# Patient Record
Sex: MALE | Race: WHITE
[De-identification: names, ages, dates, MRNs, and addresses within clinical notes are randomized per-mention and may not be internally consistent; named-entity substitution may affect disease eponyms.]

---

## 2016-10-03 VITALS
DIASTOLIC BLOOD PRESSURE: 62 MMHG | SYSTOLIC BLOOD PRESSURE: 137 MMHG | SYSTOLIC BLOOD PRESSURE: 137 MMHG | DIASTOLIC BLOOD PRESSURE: 62 MMHG | SYSTOLIC BLOOD PRESSURE: 137 MMHG | DIASTOLIC BLOOD PRESSURE: 62 MMHG

## 2017-05-04 ENCOUNTER — HOSPITAL ENCOUNTER (OUTPATIENT)
Dept: HOSPITAL 61 - ECHO | Age: 62
Discharge: HOME | End: 2017-05-04
Attending: INTERNAL MEDICINE
Payer: MEDICARE

## 2017-05-04 DIAGNOSIS — I08.0: Primary | ICD-10-CM

## 2017-05-04 PROCEDURE — C8929 TTE W OR WO FOL WCON,DOPPLER: HCPCS

## 2017-05-04 NOTE — CARD
--------------- APPROVED REPORT --------------





EXAM: Two-dimensional and M-mode echocardiogram with Doppler and color Doppler.



Other Information 

Quality : Technically LimitedHR: 61bpm

Technically limited study due to  body habitus and CABG.



INDICATION

Aortic Valve Disease



2D DIMENSIONS 

RVDd3.8 (2.9-3.5cm)Left Atrium(2D)4.7 (1.6-4.0cm)

IVSd1.4 (0.7-1.1cm)Aortic Root(2D)2.8 (2.0-3.7cm)

LVDd5.7 (3.9-5.9cm)LVOT Diameter2.1 (1.8-2.4cm)

PWd1.4 (0.7-1.1cm)LVDs4.0 (2.5-4.0cm)

FS (%) 30.4 %SV91.6 ml

LVEF(%)57.0 (>50%)



Aortic Valve

AoV Peak Dante.381.3cm/sAoV VTI92.8cm

AO Peak GR.58.2mmHgLVOT Peak Dante.120.3cm/s

LVOT  VTI 28.79cmAO Mean GR.40mmHg

ELIZABETH (VMAX)1.04pi6VWE   (VTI)1.11cm2

AI P 1/2 [id number]



Mitral Valve

MV E Yzoiyxso782.6cm/sMV DECEL [id number]

MV A Yrhkeqpx88.2cm/sMV [id number]

E/A  Ratio1.6MV A [id number]

MVA (PHT)3.90cm2



TDI

E/Lateral E'11.8E/Medial E'15.5



 LEFT VENTRICLE 

The left ventricle is normal size. There is mild concentric left ventricular hypertrophy. Left ventri
joann systolic function is normal. The Ejection Fraction is 60-65%. There is normal LV segmental wall m
otion. The left ventricular diastolic function and filling is normal for age. There is no ventricular
 septal defect visualized.



 RIGHT VENTRICLE 

The right ventricle is normal size. The right ventricular systolic function is normal.



 ATRIA 

The left atrium is mildly dilated. The right atrium size appears normal. The interatrial septum is in
tact with no evidence for an atrial septal defect or patent foramen ovale as noted on 2-D or Doppler 
imaging.



 AORTIC VALVE 

Aortic valve not well visualized. The aortic valve is severely calcified and displays decreased openi
ng. Doppler and Color Flow revealed mild aortic regurgitation. Maximum pressure gradient of 58 mmHg a
nd mean pressure gradient of 34 mmHg. Peak velocity of 3.68 m/s. Dimensionless index of 0.33. ELIZABETH 1.2
 cm2. ELIZABETH Index of 0.43. Overall, suggestive of severe aortic stenosis. 



 MITRAL VALVE 

Mitral annular calcification is mild. The mitral valve leaflets are calcified. There is no evidence o
f mitral valve prolapse. There is no mitral valve stenosis. Doppler and Color Flow revealed trace pipo
ral regurgitation.



 TRICUSPID VALVE 

The tricuspid valve is normal in structure and function. Doppler and Color Flow revealed no tricuspid
 valve regurgitation noted. There is no tricuspid valve stenosis.



 PULMONIC VALVE 

The pulmonary valve is normal in structure and function. There is no pulmonic valvular stenosis.



 GREAT VESSELS 

The aortic root is normal in size. The ascending aorta is normal in size. Due to poor image quality, 
the IVC could not be assessed.



 PERICARDIAL EFFUSION 

There is no pleural effusion. There is no evidence of significant pericardial effusion.



Critical Notification

Critical Value: No



<Conclusion>

Left ventricle systolic function is normal. The Ejection Fraction is 60-65%.

There is normal LV segmental wall motion.

Maximum pressure gradient of 58 mmHg and mean pressure gradient of 34 mmHg. Peak velocity of 3.68 m/s
. Dimensionless index of 0.33. ELIZABETH 1.2 cm2. ELIZABETH Index of 0.43. Overall, suggestive of severe aortic s
tenosis.

## 2017-07-14 ENCOUNTER — HOSPITAL ENCOUNTER (OUTPATIENT)
Dept: HOSPITAL 61 - KCIC MRI | Age: 62
Discharge: HOME | End: 2017-07-14
Attending: INTERNAL MEDICINE
Payer: MEDICARE

## 2017-07-14 DIAGNOSIS — M51.36: ICD-10-CM

## 2017-07-14 DIAGNOSIS — M48.06: Primary | ICD-10-CM

## 2017-07-14 DIAGNOSIS — M48.07: ICD-10-CM

## 2017-07-14 PROCEDURE — 72148 MRI LUMBAR SPINE W/O DYE: CPT

## 2017-07-14 NOTE — [facility]
MRI of the lumbar spine without contrast 7/14/2017

 

CLINICAL HISTORY: Low back pain which radiates down the right hip.

 

TECHNIQUE: Unenhanced T1-weighted, T2-weighted sagittal and axial and 

inversion recovery sagittal images of the lumbar spine were obtained.

 

FINDINGS: Comparison study is dated 6/22/2015

 

Minimal S-shaped curvature of the thoracolumbar spine is seen. 

Degenerative signal changes are seen involving the L3-4, L4-5 and L5-S1 

discs. Degenerative signal changes are seen within the marrow surrounding 

these discs. The conus medullaris is normal morphology, position, and 

signal characteristics.

 

The L1-2 disc space is within normal limits.

 

At the L2-3 disc space there is a mild generalized disc bulge. 

Degenerative changes are seen involving the facet joints bilaterally. 

There is mild ligamentum flavum hypertrophy bilaterally. These findings do

not result in significant central spinal canal or neural foraminal 

stenosis.

 

At the L3-4 disc space there is a mild generalized disc bulge. 

Degenerative changes are seen involving the facet joints bilaterally. 

There is mild to moderate ligamentum flavum hypertrophy. There is 

prominence of the posterior epidural fat. These findings when combined do 

not result in significant central spinal canal or neural foraminal 

stenosis.

 

At the L4-5 disc space there is a mild generalized disc bulge. 

Degenerative changes are seen involving the facet joints bilaterally. 

There is mild ligamentum flavum hypertrophy bilaterally. There is 

prominence of the posterior epidural fat. These findings when combined 

result in mild central spinal canal stenosis. No neural foraminal stenosis

is seen.

 

At the L5-S1 disc space there is a mild generalized disc bulge. 

Superimposed on the disc bulge is a right lateral focal disc protrusion. 

This measures 4 mm in AP diameter. Degenerative changes are seen involving

the facet joints bilaterally. There is mild ligamentum flavum hypertrophy 

bilaterally. These findings when combined do not result in significant 

central spinal canal stenosis. Mild right neural foraminal stenosis is 

seen. The left neural foramen is patent.

 

IMPRESSION: The changes of degenerative disc disease are seen involving 

the lumbar spine. These findings result in mild central spinal canal 

stenosis at L4-5. Mild right neural foraminal stenosis is seen at L5-S1.

 

Electronically signed by: Lm Jin MD (7/14/2017 2:43 PM) [facility]

## 2017-07-26 ENCOUNTER — HOSPITAL ENCOUNTER (OUTPATIENT)
Dept: HOSPITAL 61 - PNCL | Age: 62
Discharge: HOME | End: 2017-07-26
Attending: ANESTHESIOLOGY
Payer: MEDICARE

## 2017-07-26 DIAGNOSIS — M19.90: ICD-10-CM

## 2017-07-26 DIAGNOSIS — Z90.49: ICD-10-CM

## 2017-07-26 DIAGNOSIS — Z86.39: ICD-10-CM

## 2017-07-26 DIAGNOSIS — E78.00: ICD-10-CM

## 2017-07-26 DIAGNOSIS — Z88.6: ICD-10-CM

## 2017-07-26 DIAGNOSIS — Z87.39: ICD-10-CM

## 2017-07-26 DIAGNOSIS — Z87.891: ICD-10-CM

## 2017-07-26 DIAGNOSIS — E11.9: ICD-10-CM

## 2017-07-26 DIAGNOSIS — F17.200: ICD-10-CM

## 2017-07-26 DIAGNOSIS — F32.9: ICD-10-CM

## 2017-07-26 DIAGNOSIS — I10: ICD-10-CM

## 2017-07-26 DIAGNOSIS — Z72.0: ICD-10-CM

## 2017-07-26 DIAGNOSIS — E78.5: ICD-10-CM

## 2017-07-26 DIAGNOSIS — F41.9: ICD-10-CM

## 2017-07-26 DIAGNOSIS — H91.90: ICD-10-CM

## 2017-07-26 DIAGNOSIS — M51.16: Primary | ICD-10-CM

## 2017-07-26 DIAGNOSIS — Z86.69: ICD-10-CM

## 2017-07-26 PROCEDURE — 62323 NJX INTERLAMINAR LMBR/SAC: CPT

## 2017-07-27 NOTE — PAIN
DATE OF SERVICE:  07/26/2017



DIAGNOSES:  Lumbar radiculopathy with lumbar degenerative disk disease.



HISTORY OF PRESENT ILLNESS:  The patient is a 62-year-old male who returns for

followup, last seen in 04/2015.  The patient had a lumbar epidural steroid

injection and right sacroiliac joint injection.  He reports he did very well

after both of these to the right sacroiliac joint was helpful as was the lumbar

epidural steroid injection, but the patient reports that the pain has returned

over the past 2 years now in the low back, right lower extremity, much more

radiating at this time in to the posterior gluteus, posterior thigh, posterior

lateral thigh and into the lower leg, and sometimes into the ankle, but mostly

just into the calf on the right side.  The patient reports no new motor or

sensory deficits, no left-sided symptoms, reports his pain is anywhere from a 4

to 7 on a scale of 10, it is generally about 5.  The patient reports it as

aching and burning as well as a shooting pain that can be stabbing and cramping

in his low back and is becoming more constant, much more noticeable with

weightbearing, standing and walking much less comfortable and better with

sitting.  The patient reports it awakens him from sleep about once or twice a

night, but generally he sleeps fairly well.  It is better with lying down and

sitting is noted.  The patient reports no new motor or sensory deficits or other

complaints.  Did have a new MRI scan showing increased generalized disk bulge at

L5-S1 with a right lateral focal disk protrusion at about 4 mm in AP diameter,

also L4-L5.  The patient with some mild generalized disk bulging as well as at

L3-L4 and L2-L3.  The patient reports significant fatigability of the right

lower extremity and difficulty with walking and standing and reports he has

fallen several times over the past few months secondary to the legs feeling

unstable.



PAST MEDICAL HISTORY:  Significant for diabetes, hearing loss, shortness of

breath, sleep apnea, quit cigarette smoking, hyperlipidemia, ____, depression,

cardiac disease, arthritis, and headaches.



PREVIOUS SURGERY:  Include open heart surgery, appendectomy, left elbow surgery,

cervical fusion and tonsillectomy as well as right knee scope.



CURRENT MEDICATIONS:  Include metformin, trazodone, metoprolol, senna,

lisinopril, fluoxetine, omeprazole, NovoLog, bupropion, and trazodone.



FAMILY HISTORY:  Significant for no known medical conditions or illnesses.



SOCIAL HISTORY:  The patient has quit smoking.  Does not drink alcohol.  Lives

with his spouse and lives locally.



REVIEW OF SYSTEMS:  The patient's review of systems is positive for those items

mentioned in history of present illness, is completed, full, well documented on

the patient's chart.



PHYSICAL EXAMINATION:

VITAL SIGNS:  The patient's blood pressure is 131/62, pulse 78, respirations are

20, temperature is 98.3 degrees Fahrenheit, height is 6 feet 5 inches, weight is

334 pounds.

GENERAL:  The patient is awake, alert, oriented, appropriate, very pleasant

demeanor.

HEENT:  Head shows normocephalic, atraumatic.  Extraocular movements are intact

and symmetrical.  Oral cavity, mucous membranes are moist and pink.  Dentition

is intact.

NECK:  Shows anterior throat supple without palpable lymphadenopathy noted. 

Swallow reflex is symmetrical.  Neck shows full rotational motion with some mild

tenderness with extension, but not with forward flexion and good right and left

lateral rotation.

CHEST:  Shows normal on inspection.  Breath sounds are clear to auscultation

bilaterally.  No rales, rhonchi or wheezes were auscultated.

HEART:  Shows S1 and S2 clear.  No murmurs auscultated.

ABDOMEN:  Obese, soft, nontender, nondistended.  No palpable organomegaly is

noted.

BACK:  Shows spine grossly midline.  Normal appearing thoracic kyphosis and

lumbar lordotic curvatures.  Lumbar paraspinous muscle shows some moderate

tenderness diffusely in the lower lumbar distribution bilaterally, worse on the

right than the left, also some minor tenderness over the posterior superior

iliac spine on the right side, but not the left side, but only very minor with

palpation.  The patient shows good rotation and motion of the lumbar spine, both

laterally as well as extension and flexion without difficulty.

EXTREMITIES:  The patient's lower extremities showed deep tendon reflexes 1+ in

the patellar and tendo calcaneus tendons are equal.  Motor exam is intact with

dorsiflexion and extension rated at 5/5 at his quadriceps and hamstring flexion.

 Right side shows positive straight leg raise on the right at about 45 degrees,

but decreased with knee flexion, left side is negative.  Gaenslen's and

Isaac's maneuvers are negative bilaterally.  The patient is able to stand, but

difficulty standing and walking with significant favoring gait of his right

lower extremity.  He has a walker, but is not using it currently.



Options were discussed with the patient.  The patient's old chart was reviewed

as his current medication regimen updated.  Current review of systems updated

today as noted.  We will proceed with a lumbar epidural steroid injection.  He

has done well with these in the past by his report.  Risks were again discussed

including, but not limited to bleeding, infection, possibility of epidural

hematoma, subsequent neurologic compromise, dural puncture, headaches, spinal

cord and/or nerve damage, side effects of steroid medication and poor results

regarding pain control.  The patient understands and wishes to proceed.  The

patient will return to clinic in approximately 2 weeks for followup, was

counseled on return appointment, activity level and side effects to be aware of.



DIAGNOSIS:  Lumbar radiculopathy with lumbar degenerative disk disease.



PROCEDURES Lumbar epidural steroid injection in translaminar approach at the

L5-S1 level using C-arm fluoroscopic guidance under sterile prep and drape using

local anesthetic.  Medication injected is 120 mg Depo-Medrol plus 10 mL of

preservative-free normal saline and 2 mL of Isovue for contrast.



CONDITION AT DISCHARGE:  Stable.  The patient tolerated procedure well, had no

complications.

 



______________________________

JACKIE ALVAREZ MD



DR:  ROOSEVELT/miguelito  JOB#:  [id number] / [id number]

DD:  07/26/2017 13:38  DT:  07/27/2017 01:58

## 2017-12-31 VITALS — DIASTOLIC BLOOD PRESSURE: 57 MMHG | SYSTOLIC BLOOD PRESSURE: 128 MMHG

## 2018-03-23 ENCOUNTER — HOSPITAL ENCOUNTER (OUTPATIENT)
Dept: HOSPITAL 61 - KCIC | Age: 63
Discharge: HOME | End: 2018-03-23
Attending: INTERNAL MEDICINE
Payer: MEDICARE

## 2018-03-23 DIAGNOSIS — Z95.0: ICD-10-CM

## 2018-03-23 DIAGNOSIS — I35.0: Primary | ICD-10-CM

## 2018-03-23 DIAGNOSIS — R91.8: ICD-10-CM

## 2018-03-23 PROCEDURE — 71046 X-RAY EXAM CHEST 2 VIEWS: CPT

## 2018-08-14 ENCOUNTER — HOSPITAL ENCOUNTER (OUTPATIENT)
Dept: HOSPITAL 61 - ECHO | Age: 63
Discharge: HOME | End: 2018-08-14
Attending: INTERNAL MEDICINE
Payer: MEDICARE

## 2018-08-14 DIAGNOSIS — K21.9: ICD-10-CM

## 2018-08-14 DIAGNOSIS — E11.9: ICD-10-CM

## 2018-08-14 DIAGNOSIS — Z87.891: ICD-10-CM

## 2018-08-14 DIAGNOSIS — I11.0: ICD-10-CM

## 2018-08-14 DIAGNOSIS — I50.9: ICD-10-CM

## 2018-08-14 DIAGNOSIS — J44.9: ICD-10-CM

## 2018-08-14 DIAGNOSIS — I35.0: Primary | ICD-10-CM

## 2018-08-14 DIAGNOSIS — E78.00: ICD-10-CM

## 2018-08-14 DIAGNOSIS — I25.10: ICD-10-CM

## 2018-08-14 PROCEDURE — C8929 TTE W OR WO FOL WCON,DOPPLER: HCPCS

## 2018-08-14 NOTE — CARD
MR#: [id number]

Account#: [id number]

Accession#: [id number]

Date of Study: 08/14/2018

Ordering Physician: ISIS HARRELL, 

Referring Physician: ISIS HARRELL, 

Tech: KAREN Patino





--------------- APPROVED REPORT --------------





EXAM: Two-dimensional and M-mode echocardiogram with Doppler and color Doppler.



Other Information 

Quality : PoorHR: 91bpm

Technically limited study due to  body habitus and smoking.



INDICATION

Aortic Stenosis



Echo Enhancing Agent

Indication: Endocardial border delineation

Agent/Amount Used: Optison 3mL



Surgery/Intervention

Status/Post Aortic Valve Replacement: 

Pacemaker: 



RISK FACTORS

Hypertension 

Obesity   

Smoking 



2D DIMENSIONS 

RVDd5.2 (2.9-3.5cm)Left Atrium(2D)3.5 (1.6-4.0cm)

IVSd1.6 (0.7-1.1cm)Aortic Root(2D)3.0 (2.0-3.7cm)

LVDd3.9 (3.9-5.9cm)LVOT Diameter2.0 (1.8-2.4cm)

PWd1.6 (0.7-1.1cm)LVDs3.0 (2.5-4.0cm)

FS (%) 22.8 %SV30.0 ml

LVEF(%)46.4 (>50%)



Aortic Valve

AoV Peak Dante.111.2cm/sAoV VTI22.0cm

AO Peak GR.4.9mmHgLVOT Peak Dante.100.9cm/s

LVOT  VTI 20.82cmAO Mean GR.3mmHg

ELIZABETH (VMAX)1.74qh9UVD   (VTI)3.06cm2



Mitral Valve

MV E Apsdibcg93.2cm/sMV DECEL [id number]

MV A Gnncnizk223.4cm/sMV [id number]

E/A  Ratio0.7MVA (PHT)1.47cm2



TDI

E/Lateral E'9.5E/Medial E'8.9



Pulmonary Valve

RVOT VTI14.8cm



 LEFT VENTRICLE 

The left ventricle is normal size. There is moderate concentric left ventricular hypertrophy. The lef
t ventricular systolic function is normal and the ejection fraction is within normal range. ef 55% Th
ere is normal LV segmental wall motion. Transmitral Doppler flow pattern is Grade I-abnormal relaxati
on pattern.



 RIGHT VENTRICLE 

The right ventricle is mildly dilated. The right ventricular systolic function is normal.



 ATRIA 

The left atrium size is normal. The right atrium size is normal. The interatrial septum is intact wit
h no evidence for an atrial septal defect or patent foramen ovale as noted on 2-D or Doppler imaging.




 AORTIC VALVE 

Doppler and Color Flow revealed no significant aortic regurgitation. There is no significant aortic v
alvular stenosis. There is no aortic valvular vegetation. Transcatheter aortic valve not well visuali
zed. Grossly no significant stenosis or regurgitation. 



 MITRAL VALVE 

The mitral valve is not well visualized. There is no mitral valve stenosis. Doppler and Color Flow re
vealed no mitral valve regurgitation noted.



 TRICUSPID VALVE 

The tricuspid valve is not well visualized. Doppler and color-flow analysis was performed. There is n
o tricuspid valve stenosis.



 PULMONIC VALVE 

The pulmonic valve is not well visualized. Doppler and Color Flow revealed no pulmonic valvular regur
gitation. There is no pulmonic valvular stenosis.



 GREAT VESSELS 

The aortic root is not well visualized. The IVC was not visualized.



 PERICARDIAL EFFUSION 

There is no pleural effusion. There is no evidence of significant pericardial effusion.



Critical Notification

Critical Value: No



<Conclusion>

The left ventricular systolic function is normal and the ejection fraction is within normal range. ef
 55%

There is normal LV segmental wall motion.

The right ventricle is mildly  dilated.

Transcatheter aortic valve not well visualized. Grossly no significant stenosis or regurgitation.

Technically very difficult study despite contrast use due to body habitus.



Signed by : Isis Harrell, 

Electronically Approved : 08/14/2018 11:27:49

## 2018-10-06 ENCOUNTER — HOSPITAL ENCOUNTER (EMERGENCY)
Dept: HOSPITAL 61 - ER | Age: 63
LOS: 1 days | Discharge: HOME | End: 2018-10-07
Payer: MEDICARE

## 2018-10-06 VITALS — HEIGHT: 77 IN | WEIGHT: 315 LBS | BODY MASS INDEX: 37.19 KG/M2

## 2018-10-06 DIAGNOSIS — R55: ICD-10-CM

## 2018-10-06 DIAGNOSIS — M19.90: ICD-10-CM

## 2018-10-06 DIAGNOSIS — Z88.8: ICD-10-CM

## 2018-10-06 DIAGNOSIS — R07.89: ICD-10-CM

## 2018-10-06 DIAGNOSIS — R60.0: ICD-10-CM

## 2018-10-06 DIAGNOSIS — I10: ICD-10-CM

## 2018-10-06 DIAGNOSIS — Z95.0: ICD-10-CM

## 2018-10-06 DIAGNOSIS — E11.40: ICD-10-CM

## 2018-10-06 DIAGNOSIS — J45.909: ICD-10-CM

## 2018-10-06 DIAGNOSIS — I25.10: ICD-10-CM

## 2018-10-06 DIAGNOSIS — E78.00: ICD-10-CM

## 2018-10-06 DIAGNOSIS — Z95.5: ICD-10-CM

## 2018-10-06 DIAGNOSIS — K21.9: ICD-10-CM

## 2018-10-06 DIAGNOSIS — R25.2: Primary | ICD-10-CM

## 2018-10-06 DIAGNOSIS — Z98.1: ICD-10-CM

## 2018-10-06 PROCEDURE — 85025 COMPLETE CBC W/AUTO DIFF WBC: CPT

## 2018-10-06 PROCEDURE — 84484 ASSAY OF TROPONIN QUANT: CPT

## 2018-10-06 PROCEDURE — 93970 EXTREMITY STUDY: CPT

## 2018-10-06 PROCEDURE — 71045 X-RAY EXAM CHEST 1 VIEW: CPT

## 2018-10-06 PROCEDURE — 85379 FIBRIN DEGRADATION QUANT: CPT

## 2018-10-06 PROCEDURE — 36415 COLL VENOUS BLD VENIPUNCTURE: CPT

## 2018-10-06 PROCEDURE — 93005 ELECTROCARDIOGRAM TRACING: CPT

## 2018-10-06 PROCEDURE — 80053 COMPREHEN METABOLIC PANEL: CPT

## 2018-10-07 VITALS — DIASTOLIC BLOOD PRESSURE: 59 MMHG | SYSTOLIC BLOOD PRESSURE: 135 MMHG

## 2018-10-07 LAB
ALBUMIN SERPL-MCNC: 3.5 G/DL (ref 3.4–5)
ALBUMIN/GLOB SERPL: 0.9 {RATIO} (ref 1–1.7)
ALP SERPL-CCNC: 122 U/L (ref 46–116)
ALT SERPL-CCNC: 30 U/L (ref 16–63)
ANION GAP SERPL CALC-SCNC: 9 MMOL/L (ref 6–14)
AST SERPL-CCNC: 30 U/L (ref 15–37)
BASOPHILS # BLD AUTO: 0.1 X10^3/UL (ref 0–0.2)
BASOPHILS NFR BLD: 1 % (ref 0–3)
BILIRUB SERPL-MCNC: 0.3 MG/DL (ref 0.2–1)
BUN SERPL-MCNC: 20 MG/DL (ref 8–26)
BUN/CREAT SERPL: 14 (ref 6–20)
CALCIUM SERPL-MCNC: 9.6 MG/DL (ref 8.5–10.1)
CHLORIDE SERPL-SCNC: 100 MMOL/L (ref 98–107)
CO2 SERPL-SCNC: 33 MMOL/L (ref 21–32)
CREAT SERPL-MCNC: 1.4 MG/DL (ref 0.7–1.3)
EOSINOPHIL NFR BLD: 0.3 X10^3/UL (ref 0–0.7)
EOSINOPHIL NFR BLD: 3 % (ref 0–3)
ERYTHROCYTE [DISTWIDTH] IN BLOOD BY AUTOMATED COUNT: 17.5 % (ref 11.5–14.5)
GFR SERPLBLD BASED ON 1.73 SQ M-ARVRAT: 51 ML/MIN
GLOBULIN SER-MCNC: 3.8 G/DL (ref 2.2–3.8)
GLUCOSE SERPL-MCNC: 100 MG/DL (ref 70–99)
HCT VFR BLD CALC: 36.3 % (ref 39–53)
HGB BLD-MCNC: 12 G/DL (ref 13–17.5)
LYMPHOCYTES # BLD: 2.4 X10^3/UL (ref 1–4.8)
LYMPHOCYTES NFR BLD AUTO: 24 % (ref 24–48)
MCH RBC QN AUTO: 26 PG (ref 25–35)
MCHC RBC AUTO-ENTMCNC: 33 G/DL (ref 31–37)
MCV RBC AUTO: 79 FL (ref 79–100)
MONO #: 1.3 X10^3/UL (ref 0–1.1)
MONOCYTES NFR BLD: 13 % (ref 0–9)
NEUT #: 5.8 X10^3UL (ref 1.8–7.7)
NEUTROPHILS NFR BLD AUTO: 59 % (ref 31–73)
PLATELET # BLD AUTO: 256 X10^3/UL (ref 140–400)
POTASSIUM SERPL-SCNC: 4.2 MMOL/L (ref 3.5–5.1)
PROT SERPL-MCNC: 7.3 G/DL (ref 6.4–8.2)
RBC # BLD AUTO: 4.61 X10^6/UL (ref 4.3–5.7)
SODIUM SERPL-SCNC: 142 MMOL/L (ref 136–145)
WBC # BLD AUTO: 9.9 X10^3/UL (ref 4–11)

## 2018-10-07 NOTE — RAD
Chest AP portable at 1205:

 

Reason for examination: Syncope. Chest pain.

 

Comparison is made to previous study dated 3/23/2018.

 

Pacemaker remains present over the left hemithorax. There are postop 

changes in the sternum. The heart size appears to be enlarged. Lung fields

are clear. No acute bony abnormalities are seen. There are postop changes 

in the lower cervical spine.

 

IMPRESSION:

 

Cardiomegaly. No acute congestion or infiltrates.

 

Electronically signed by: Lorna Norris MD (10/7/2018 2:46 AM) [facility]

## 2018-10-07 NOTE — RAD
Bilateral lower extremity venous Doppler:

 

Reason for examination: Bilateral leg edema.

 

The right and left lower extremity venous systems were evaluated from the 

common femoral and greater saphenous veins distally to the calf veins with

grayscale imaging, color-flow imaging and spectral analysis.

 

There is no evidence of deep venous thrombosis seen. There appears to be 

normal venous blood flow. There is normal response of the venous systems 

to compression and augmentation.

 

IMPRESSION:

 

No deep venous thrombosis in the right or left lower extremity.

 

Electronically signed by: Lorna Norris MD (10/7/2018 1:31 AM) [facility]

## 2018-10-07 NOTE — EKG
[facility]

              [address] [place] [address]

Test Date:    2018-10-06               Test Time:    23:51:54

Pat Name:     ILIANA PINON          Department:   

Patient ID:   [id number]           Room:          

Gender:       M                        Technician:   

:          [id number]               Requested By: LUZMA MORGAN

Order Number: [id number]           Reading MD:     

                                 Measurements

Intervals                              Axis          

Rate:         92                       P:            31

NY:           178                      QRS:          69

QRSD:         146                      T:            23

QT:           408                                    

QTc:          510                                    

                           Interpretive Statements

SINUS RHYTHM

RIGHT BUNDLE BRANCH BLOCK

RVH WITH REPOLARIZATION ABNORMALITY

ABNORMAL ECG

RI6.01

No previous ECG available for comparison

## 2018-10-07 NOTE — PHYS DOC
Past Medical History


Past Medical History:  Arthritis, CAD, Depression, Diabetes-Type II, GERD, High 

Cholesterol, Hypertension, Other


Additional Past Medical Histor:  neuropathy


Past Surgical History:  Other


Additional Past Surgical Histo:  bilat great toes,bilat knee sx,R elbow,neck 

fusion,PACEMAKER,STENT,VALVE


Alcohol Use:  None


Drug Use:  None





Adult General


Chief Complaint


Chief Complaint:  WEAKNESS/GENERALIZED





HPI


HPI





Patient is a 63  year old male with history of CAD, osteoarthritis, trouble and 

diabetes, who presents with accidental fall from standing. Patient states he 

was getting out of bed to use bathroom please give out causing him to fall. 

Patient denies injury from fall but states he was unable to stand. Spouse 

reports the patient was week and difficulty responding, symptoms  corrected 

prior to EMS arrival. Patient denies headache, head injury, neck pain, 

palpitations, shortness of breath, focal extremity weakness or loss of 

sensation. Patient does acknowledge taking sleeping pill prior to bed per his 

normal routine. Other than leg cramps which are chronic, he denies any symptoms 

in the emergency department..[]





Review of Systems


Review of Systems


Review symptoms as per history of present illness. All other review symptoms 

are negative.[]





All other systems were reviewed and found to be within normal limits, except as 

documented in this note.





Allergies


Allergies





Allergies








Coded Allergies Type Severity Reaction Last Updated Verified


 


  cortisone Allergy Intermediate Pt had swelling at injection site 12/17/15 Yes











Physical Exam


Physical Exam





Constitutional: Well developed, well nourished, no acute distress, non-toxic 

appearance. []


HENT: Normocephalic, atraumatic, bilateral external ears normal, oropharynx 

moist, no oral exudates, nose normal. []


Eyes: PERRLA, EOMI, conjunctiva normal, no discharge. [] 


Neck: Normal range of motion, no tenderness, supple, no stridor. [] 


Cardiovascular:Heart rate regular rhythm, no murmur []


Lungs & Thorax:  Bilateral breath sounds clear to auscultation []


Abdomen: Bowel sounds normal, soft, no tenderness. [] 


Skin: Warm, dry, no erythema, no rash. [] 


Back: No tenderness, no CVA tenderness. [] 


Extremities: No tenderness, no edema. [] 


Neurologic: Alert and oriented X 3, normal motor function, normal sensory 

function, no focal deficits noted. []


Psychologic: Affect normal, judgement normal, mood normal. []





Current Patient Data


Vital Signs





 Vital Signs








  Date Time  Temp Pulse Resp B/P (MAP) Pulse Ox O2 Delivery O2 Flow Rate FiO2


 


10/7/18 02:45  90 20  92   


 


10/6/18 23:53 98.1   120/72 (88)  Room Air  





 98.1       








Lab Values





 Laboratory Tests








Test


 10/6/18


23:05


 


White Blood Count


 9.9 x10^3/uL


(4.0-11.0)


 


Red Blood Count


 4.61 x10^6/uL


(4.30-5.70)


 


Hemoglobin


 12.0 g/dL


(13.0-17.5)  L


 


Hematocrit


 36.3 %


(39.0-53.0)  L


 


Mean Corpuscular Volume


 79 fL ()





 


Mean Corpuscular Hemoglobin 26 pg (25-35)  


 


Mean Corpuscular Hemoglobin


Concent 33 g/dL


(31-37)


 


Red Cell Distribution Width


 17.5 %


(11.5-14.5)  H


 


Platelet Count


 256 x10^3/uL


(140-400)


 


Neutrophils (%) (Auto) 59 % (31-73)  


 


Lymphocytes (%) (Auto) 24 % (24-48)  


 


Monocytes (%) (Auto) 13 % (0-9)  H


 


Eosinophils (%) (Auto) 3 % (0-3)  


 


Basophils (%) (Auto) 1 % (0-3)  


 


Neutrophils # (Auto)


 5.8 x10^3uL


(1.8-7.7)


 


Lymphocytes # (Auto)


 2.4 x10^3/uL


(1.0-4.8)


 


Monocytes # (Auto)


 1.3 x10^3/uL


(0.0-1.1)  H


 


Eosinophils # (Auto)


 0.3 x10^3/uL


(0.0-0.7)


 


Basophils # (Auto)


 0.1 x10^3/uL


(0.0-0.2)


 


D-Dimer (Kalee)


 0.75 ug/mlFEU


(0.00-0.50)  H


 


Sodium Level


 142 mmol/L


(136-145)


 


Potassium Level


 4.2 mmol/L


(3.5-5.1)


 


Chloride Level


 100 mmol/L


()


 


Carbon Dioxide Level


 33 mmol/L


(21-32)  H


 


Anion Gap 9 (6-14)  


 


Blood Urea Nitrogen


 20 mg/dL


(8-26)


 


Creatinine


 1.4 mg/dL


(0.7-1.3)  H


 


Estimated GFR


(Cockcroft-Gault) 51.0  





 


BUN/Creatinine Ratio 14 (6-20)  


 


Glucose Level


 100 mg/dL


(70-99)  H


 


Calcium Level


 9.6 mg/dL


(8.5-10.1)


 


Total Bilirubin


 0.3 mg/dL


(0.2-1.0)


 


Aspartate Amino Transferase


(AST) 30 U/L (15-37)





 


Alanine Aminotransferase (ALT)


 30 U/L (16-63)





 


Alkaline Phosphatase


 122 U/L


()  H


 


Troponin I Quantitative


 < 0.017 ng/mL


(0.000-0.055)


 


Total Protein


 7.3 g/dL


(6.4-8.2)


 


Albumin


 3.5 g/dL


(3.4-5.0)


 


Albumin/Globulin Ratio


 0.9 (1.0-1.7)


L





 Laboratory Tests


10/6/18 23:05








 Laboratory Tests


10/6/18 23:05














EKG


EKG


[EKG: NAD]





Radiology/Procedures


Radiology/Procedures


CXR: NAD[]





Course & Med Decision Making


Course & Med Decision Making


Pertinent Labs and Imaging studies reviewed. (See chart for details)





[Labs, EKG imaging reviewed. Patient able to walk and wheelchair with steady 

gait.  feels improved and requests discharge home. Recommend follow-up 

with the for further evaluation. Return precautions reviewed. Patient 

verbalizes understanding agreement discharge instructions prior to departure.]





Dragon Disclaimer


Dragon Disclaimer


This electronic medical record was generated, in whole or in part, using a 

voice recognition dictation system.





Departure


Departure


Impression:  


 Primary Impression:  


 Bilateral leg cramps


Disposition:  01 HOME, SELF-CARE


Condition:  STABLE


Patient Instructions:  Leg Cramps





Additional Instructions:  


You were valuated emergency department for leg cramping brief change of 

consciousness. CT, lab and imaging studies were performed. The cause of your 

symptoms has not been determined. Please keep your walker next to the bed and 

use a bedside commode or urinal. Contact your PCP tomorrow and schedule follow-

up appointment early next week. Return to the ED if new or worsening symptoms..











LUZMA MORGAN DO Oct 7, 2018 05:21

## 2018-11-17 VITALS — DIASTOLIC BLOOD PRESSURE: 74 MMHG | SYSTOLIC BLOOD PRESSURE: 129 MMHG

## 2018-11-27 ENCOUNTER — HOSPITAL ENCOUNTER (OUTPATIENT)
Dept: HOSPITAL 61 - KCIC US | Age: 63
Discharge: HOME | End: 2018-11-27
Attending: INTERNAL MEDICINE
Payer: MEDICARE

## 2018-11-27 DIAGNOSIS — I65.23: Primary | ICD-10-CM

## 2018-11-27 PROCEDURE — 93880 EXTRACRANIAL BILAT STUDY: CPT

## 2018-11-27 NOTE — [facility]
EXAM: Carotid Doppler sonogram.

 

HISTORY: Carotid bruit.

 

TECHNIQUE: Gray scale and color Doppler sonographic evaluation of the neck

with spectral waveform analysis was performed and static images are 

submitted for review. 

 

FINDINGS: The exam is extremely limited due to patient body habitus and 

motion throughout the exam. There is moderate atherosclerotic plaque 

within the right greater left common carotid arteries, bilateral carotid 

bulbs and proximal internal and external carotid arteries.

 

The peak systolic velocity within the right common carotid artery is 140 

cm/sec. The peak systolic velocity within the right internal carotid 

artery is 172 cm/sec and the end diastolic velocity within the right 

internal carotid artery is 27 cm/sec. The right ICA/CCA ratio is 1.33.

 

The peak systolic velocity within the left common carotid artery is 1 

heart and 35 cm/sec. The peak systolic velocity within the left internal 

carotid artery is 98 cm/sec and the end diastolic velocity within the left

internal carotid artery is 28 cm/sec. The left ICA/CCA ratio is 0.72.

 

There is normal antegrade flow within the right vertebral artery. The left

vertebral artery is not well seen.

 

IMPRESSION:

1. Significantly limited exam due to body habitus and patient motion.

2. Moderate atherosclerotic plaque throughout the common, internal and 

external carotid arteries and carotid bulbs.

3. Elevated peak systolic velocity within the right ICA. Despite normal 

ICA to CCA ratio, this suggests 50-69 percent stenosis.

4. Suboptimal evaluation of the left vertebral artery. This may be due to 

low flow, hypoplasia or aforementioned study limitations.

 

 

 

 

PQRS Compliance Statement - Stenosis calculations for CT, MR and 

conventional angiography are based upon measurement of the distal ICA 

diameter in accordance with the NASCET methodology.  Stenosis calculations

for carotid ultrasound studies are derived from validated velocity 

criteria which are known to correlate with the NASCET methodology.

 

Electronically signed by: Kait Whittington MD (11/27/2018 3:37 PM) 

[facility]

## 2019-04-23 ENCOUNTER — HOSPITAL ENCOUNTER (EMERGENCY)
Dept: HOSPITAL 61 - ER | Age: 64
Discharge: HOME | End: 2019-04-23
Payer: MEDICARE

## 2019-04-23 VITALS
SYSTOLIC BLOOD PRESSURE: 169 MMHG | SYSTOLIC BLOOD PRESSURE: 169 MMHG | DIASTOLIC BLOOD PRESSURE: 70 MMHG | DIASTOLIC BLOOD PRESSURE: 70 MMHG | SYSTOLIC BLOOD PRESSURE: 169 MMHG | SYSTOLIC BLOOD PRESSURE: 169 MMHG | DIASTOLIC BLOOD PRESSURE: 70 MMHG | DIASTOLIC BLOOD PRESSURE: 70 MMHG

## 2019-04-23 VITALS — WEIGHT: 315 LBS | BODY MASS INDEX: 37.19 KG/M2 | HEIGHT: 77 IN

## 2019-04-23 DIAGNOSIS — E78.00: ICD-10-CM

## 2019-04-23 DIAGNOSIS — Z95.5: ICD-10-CM

## 2019-04-23 DIAGNOSIS — K21.9: ICD-10-CM

## 2019-04-23 DIAGNOSIS — Z88.8: ICD-10-CM

## 2019-04-23 DIAGNOSIS — I25.10: ICD-10-CM

## 2019-04-23 DIAGNOSIS — R06.02: ICD-10-CM

## 2019-04-23 DIAGNOSIS — E11.40: ICD-10-CM

## 2019-04-23 DIAGNOSIS — Z95.0: ICD-10-CM

## 2019-04-23 DIAGNOSIS — R07.89: Primary | ICD-10-CM

## 2019-04-23 DIAGNOSIS — I10: ICD-10-CM

## 2019-04-23 DIAGNOSIS — F32.9: ICD-10-CM

## 2019-04-23 LAB
ALBUMIN SERPL-MCNC: 3.1 G/DL (ref 3.4–5)
ALBUMIN/GLOB SERPL: 0.9 {RATIO} (ref 1–1.7)
ALP SERPL-CCNC: 113 U/L (ref 46–116)
ALT SERPL-CCNC: 23 U/L (ref 16–63)
ANION GAP SERPL CALC-SCNC: 8 MMOL/L (ref 6–14)
AST SERPL-CCNC: 25 U/L (ref 15–37)
BASOPHILS # BLD AUTO: 0 X10^3/UL (ref 0–0.2)
BASOPHILS NFR BLD: 1 % (ref 0–3)
BILIRUB SERPL-MCNC: 0.3 MG/DL (ref 0.2–1)
BUN SERPL-MCNC: 17 MG/DL (ref 8–26)
BUN/CREAT SERPL: 14 (ref 6–20)
CALCIUM SERPL-MCNC: 8.9 MG/DL (ref 8.5–10.1)
CHLORIDE SERPL-SCNC: 104 MMOL/L (ref 98–107)
CO2 SERPL-SCNC: 29 MMOL/L (ref 21–32)
CREAT SERPL-MCNC: 1.2 MG/DL (ref 0.7–1.3)
EOSINOPHIL NFR BLD: 0.1 X10^3/UL (ref 0–0.7)
EOSINOPHIL NFR BLD: 2 % (ref 0–3)
ERYTHROCYTE [DISTWIDTH] IN BLOOD BY AUTOMATED COUNT: 17.2 % (ref 11.5–14.5)
GFR SERPLBLD BASED ON 1.73 SQ M-ARVRAT: 61 ML/MIN
GLOBULIN SER-MCNC: 3.3 G/DL (ref 2.2–3.8)
GLUCOSE SERPL-MCNC: 150 MG/DL (ref 70–99)
HCT VFR BLD CALC: 33.7 % (ref 39–53)
HGB BLD-MCNC: 10.6 G/DL (ref 13–17.5)
LYMPHOCYTES # BLD: 1.3 X10^3/UL (ref 1–4.8)
LYMPHOCYTES NFR BLD AUTO: 18 % (ref 24–48)
MAGNESIUM SERPL-MCNC: 1.7 MG/DL (ref 1.8–2.4)
MCH RBC QN AUTO: 25 PG (ref 25–35)
MCHC RBC AUTO-ENTMCNC: 31 G/DL (ref 31–37)
MCV RBC AUTO: 78 FL (ref 79–100)
MONO #: 0.9 X10^3/UL (ref 0–1.1)
MONOCYTES NFR BLD: 12 % (ref 0–9)
NEUT #: 5 X10^3UL (ref 1.8–7.7)
NEUTROPHILS NFR BLD AUTO: 68 % (ref 31–73)
PLATELET # BLD AUTO: 239 X10^3/UL (ref 140–400)
POTASSIUM SERPL-SCNC: 4.4 MMOL/L (ref 3.5–5.1)
PROT SERPL-MCNC: 6.4 G/DL (ref 6.4–8.2)
PROTHROMBIN TIME: 13.5 SEC (ref 11.7–14)
RBC # BLD AUTO: 4.32 X10^6/UL (ref 4.3–5.7)
SODIUM SERPL-SCNC: 141 MMOL/L (ref 136–145)
WBC # BLD AUTO: 7.4 X10^3/UL (ref 4–11)

## 2019-04-23 PROCEDURE — 84484 ASSAY OF TROPONIN QUANT: CPT

## 2019-04-23 PROCEDURE — 85025 COMPLETE CBC W/AUTO DIFF WBC: CPT

## 2019-04-23 PROCEDURE — 71275 CT ANGIOGRAPHY CHEST: CPT

## 2019-04-23 PROCEDURE — 83735 ASSAY OF MAGNESIUM: CPT

## 2019-04-23 PROCEDURE — 93005 ELECTROCARDIOGRAM TRACING: CPT

## 2019-04-23 PROCEDURE — 83880 ASSAY OF NATRIURETIC PEPTIDE: CPT

## 2019-04-23 PROCEDURE — 99285 EMERGENCY DEPT VISIT HI MDM: CPT

## 2019-04-23 PROCEDURE — 36415 COLL VENOUS BLD VENIPUNCTURE: CPT

## 2019-04-23 PROCEDURE — 85610 PROTHROMBIN TIME: CPT

## 2019-04-23 PROCEDURE — 80053 COMPREHEN METABOLIC PANEL: CPT

## 2019-04-23 NOTE — PDOC2
CARDIAC CONSULT


DATE OF CONSULT


Date of Consult


DATE: 4/23/19 


TIME: 14:55





REASON FOR CONSULT


Reason for Consult:


Chest pain





REFERRING PHYSICIAN


Referring Physician:


Azar





SOURCE


Source:  Chart review, Patient





HISTORY OF PRESENT ILLNESS


HISTORY OF PRESENT ILLNESS


This is a pleasant 62 yo male admitted for complains of chest pain and SOA.  He 

is originally going to see his PCP today but she was on vacation and saw 

somebody else instead.  He complained of CP and SOA and some leg discomfort and 

was advised to go to ED for further eval.  I talked to him and his left chest 

pain is chronic and has not been worse and its dull.  His SOA is not 

significant.  His acitivity tolerance is very limited and blames his LUTHER due to 

his wt gain of 25 pounds in the last several months.  He does have DPN to his LE

but no open wounds.  He has known PAD but this is medically treated. No nausea, 

jaw tightness, palpitations.  His exertion does not make his CP worse or his LUTHER

is the same with exertion.  He has been complaint with his medications. No 

significant leg edema no different from his baseline and no PND or wheezing. No 

recent falls or injury.





PAST MEDICAL HISTORY


Past Medical History


Cardiovascular:  CAD, HTN, Syncope, Hyperlipidemia, Aortic stenosis, Other 

(orthostatic hypotension)


Pulmonary:  COPD, Other (SHADI)


CENTRAL NERVOUS SYSTEM:  Periperal neuropathy


GI:  GERD


Hepatobiliary:  Cholelithiasis


Psych:  Depression


Musculoskeletal:  Osteoarthritis


Rheumatologic:  Fibromyalgia


Endocrine:  Diabetes (2)








PAST SURGICAL HISTORY


Past Surgical History


Pacemaker, Appendectomy, Arthroscopy (right wrist left elbow), Cholecystectomy, 

CABG (x4 12/2015 LIMA graft to the LAD, a saphenous vein graft to the PDA and a 

second saphenous vein graft in skip fashion from a right ramus vessel and to an 

obtuse marginal vessel. ), Other (TAVR 11/2017; sinus surgery)





FAMILY HISTORY


Family History:  Heart Disease





SOCIAL HISTORY


Social History


Smoke:  Quit (76 pk yr)


ALCOHOL:  none


Drugs:  None


Lives:  alone





CURRENT MEDICATIONS


CURRENT MEDICATIONS





Current Medications








 Medications


  (Trade)  Dose


 Ordered  Sig/Mirian


 Route


 PRN Reason  Start Time


 Stop Time Status Last Admin


Dose Admin


 


 Iohexol


  (Omnipaque 350


 Mg/ml)  100 ml  1X  ONCE


 IV


   4/23/19 13:30


 4/23/19 13:31 DC 4/23/19 13:35














ALLERGIES


ALLERGIES:  


Coded Allergies:  


     cortisone (Verified  Allergy, Intermediate, Pt had swelling at injection 

site, 12/17/15)





ROS


Review of System


14 point ROS evaluated with pertinent positives noted per HPI





PHYSICAL EXAM


General:  Alert, Oriented X3, Cooperative, No acute distress


HEENT:  Atraumatic, Mucous membr. moist/pink


Lungs:  Clear to auscultation, Normal air movement


Heart:  Regular rate (SR)


Abdomen:  Soft, No tenderness, Other (obese)


Extremities:  No cyanosis, Other (1-2+ bilateral LE pitting edema)


Skin:  No breakdown, No significant lesion


Neuro:  Normal speech, Sensation intact


Psych/Mental Status:  Mental status NL, Mood NL


MUSCULOSKELETAL:  Osteoarthritic changes both hands





VITALS


VITALS





Vital Signs








  Date Time  Temp Pulse Resp B/P (MAP) Pulse Ox O2 Delivery O2 Flow Rate FiO2


 


4/23/19 12:46 97.9 93 20 145/65 (91) 94 Room Air  





 97.9       











LABS


Lab:





Laboratory Tests








Test


 4/23/19


12:50 4/23/19


12:55


 


White Blood Count


 7.4 x10^3/uL


(4.0-11.0) 





 


Red Blood Count


 4.32 x10^6/uL


(4.30-5.70) 





 


Hemoglobin


 10.6 g/dL


(13.0-17.5) 





 


Hematocrit


 33.7 %


(39.0-53.0) 





 


Mean Corpuscular Volume 78 fL ()  


 


Mean Corpuscular Hemoglobin 25 pg (25-35)  


 


Mean Corpuscular Hemoglobin


Concent 31 g/dL


(31-37) 





 


Red Cell Distribution Width


 17.2 %


(11.5-14.5) 





 


Platelet Count


 239 x10^3/uL


(140-400) 





 


Neutrophils (%) (Auto) 68 % (31-73)  


 


Lymphocytes (%) (Auto) 18 % (24-48)  


 


Monocytes (%) (Auto) 12 % (0-9)  


 


Eosinophils (%) (Auto) 2 % (0-3)  


 


Basophils (%) (Auto) 1 % (0-3)  


 


Neutrophils # (Auto)


 5.0 x10^3uL


(1.8-7.7) 





 


Lymphocytes # (Auto)


 1.3 x10^3/uL


(1.0-4.8) 





 


Monocytes # (Auto)


 0.9 x10^3/uL


(0.0-1.1) 





 


Eosinophils # (Auto)


 0.1 x10^3/uL


(0.0-0.7) 





 


Basophils # (Auto)


 0.0 x10^3/uL


(0.0-0.2) 





 


Prothrombin Time


 13.5 SEC


(11.7-14.0) 





 


Prothromb Time International


Ratio 1.1 (0.8-1.1) 


 





 


Sodium Level


 141 mmol/L


(136-145) 





 


Potassium Level


 4.4 mmol/L


(3.5-5.1) 





 


Chloride Level


 104 mmol/L


() 





 


Carbon Dioxide Level


 29 mmol/L


(21-32) 





 


Anion Gap 8 (6-14)  


 


Blood Urea Nitrogen


 17 mg/dL


(8-26) 





 


Creatinine


 1.2 mg/dL


(0.7-1.3) 





 


Estimated GFR


(Cockcroft-Gault) 61.0 


 





 


BUN/Creatinine Ratio 14 (6-20)  


 


Glucose Level


 150 mg/dL


(70-99) 





 


Calcium Level


 8.9 mg/dL


(8.5-10.1) 





 


Magnesium Level


 1.7 mg/dL


(1.8-2.4) 





 


Total Bilirubin


 0.3 mg/dL


(0.2-1.0) 





 


Aspartate Amino Transf


(AST/SGOT) 25 U/L (15-37) 


 





 


Alanine Aminotransferase


(ALT/SGPT) 23 U/L (16-63) 


 





 


Alkaline Phosphatase


 113 U/L


() 





 


NT-Pro-B-Type Natriuretic


Peptide 195 pg/mL


(0-124) 





 


Total Protein


 6.4 g/dL


(6.4-8.2) 





 


Albumin


 3.1 g/dL


(3.4-5.0) 





 


Albumin/Globulin Ratio 0.9 (1.0-1.7)  


 


Troponin I Quantitative


 


 0.041 ng/mL


(0.000-0.055)











ECHOCARDIOGRAM


ECHOCARDIOGRAM





<Conclusion>


The left ventricular systolic function is normal and the ejection fraction is 

within normal range. ef 55%


There is normal LV segmental wall motion.


The right ventricle is mildly  dilated.


Transcatheter aortic valve not well visualized. Grossly no significant stenosis 

or regurgitation.


Technically very difficult study despite contrast use due to body habitus.





DATE:     08/14/18 1127





ASSESSMENT/PLAN


ASSESSMENT/PLAN


1. Atypical CP; possibly MSK


2. Hx SH-OH with likely associated autonomic neuropathy


3. CAD: 12/2015 CABGx4. clinically stable


4. PPM in situ: dual chamber, [place]


5. Post TAVR: 11/2017


6. HTN: No antiHTN meds due to orthostasis. controlled


7. DM2/HLP


8. Morbid obesity/SHADI; sometimes not compliant with CPAP. 


9. COPD


10. Hx of multiple falls. 





Recommendations


1. discussed wt loss and compliance with diet. 


2. Continue with home regimen.


3. May DC if trop is normal. Will arrange for outpt stress test


4. Follow up in office as scheduled in 4 weeks.











TAISHA TATE          Apr 23, 2019 15:01

## 2019-04-23 NOTE — PHYS DOC
Past Medical History


Past Medical History:  Arthritis, CAD, Depression, Diabetes-Type II, GERD, High 

Cholesterol, Hypertension, Other


Additional Past Medical Histor:  neuropathy,SLEEP APNEA


Past Surgical History:  Angioplasty, Pacemaker, Other


Additional Past Surgical Histo:  bilat great toes/knee sx,L elbow,neck 

fusion,STENT,VALVE,ANAL FISSURE


Alcohol Use:  None


Drug Use:  None





Adult General


Chief Complaint


Chief Complaint:  SHORTNESS OF BREATH





HPI


HPI





Patient is a 63 year old M who presents with SOB and CP. He has a history of a 

quadruple bypass. He says he is sob and has cp with exertion. He denies 

dizziness, syncope, vomiting, fever, cough. He denies orthopnea. He says he 

[place] and became very sob. 





Cardiologist: Dr Faria





Review of Systems


Review of Systems





Constitutional: Denies fever or chills 


Eyes: Denies change in visual acuity, redness, or eye pain 


HENT: Denies nasal congestion or sore throat 


Respiratory: Denies cough; endorses shortness of breath 


Cardiovascular: endorses cp


GI: Denies abdominal pain, nausea, vomiting, bloody stools or diarrhea 


: Denies dysuria or hematuria 


Musculoskeletal: Denies back pain or joint pain 


Integument: Denies rash or skin lesions 


Neurologic: Denies headache, focal weakness or sensory changes 


Endocrine: Denies polyuria or polydipsia 





All other systems were reviewed and found to be within normal limits, except as 

documented in this note.





Current Medications


Current Medications





Current Medications








 Medications


  (Trade)  Dose


 Ordered  Sig/Mirian  Start Time


 Stop Time Status Last Admin


Dose Admin


 


 Info


  (CONTRAST GIVEN


 -- Rx MONITORING)  1 each  PRN DAILY  PRN  4/23/19 13:30


 4/25/19 13:29   





 


 Iohexol


  (Omnipaque 350


 Mg/ml)  100 ml  1X  ONCE  4/23/19 13:30


 4/23/19 13:31 DC 4/23/19 13:35


100 ML











Allergies


Allergies





Allergies








Coded Allergies Type Severity Reaction Last Updated Verified


 


  cortisone Allergy Intermediate Pt had swelling at injection site 12/17/15 Yes











Physical Exam


Physical Exam





Constitutional: Well developed, well nourished, no acute distress, non-toxic 

appearance. 


HENT: Normocephalic, atraumatic, bilateral external ears normal, oropharynx 

moist, no oral exudates, nose normal. 


Eyes: PERRLA, EOMI, conjunctiva normal, no discharge.  


Neck: Normal range of motion, no tenderness, supple, no stridor.  


Cardiovascular:Heart rate regular rhythm, no murmur 


Lungs & Thorax:  Bilateral breath sounds clear to auscultation 


Abdomen: Bowel sounds normal, soft, no tenderness, no masses, no pulsatile 

masses.  


Skin: Warm, dry, no erythema, no rash.  


Back: No tenderness, no CVA tenderness.  


Extremities: No tenderness, no cyanosis, no clubbing, ROM intact, no edema.  


Neurologic: Alert and oriented X 3, normal motor function, normal sensory 

function, no focal deficits noted. 


Psychologic: Affect normal, judgement normal, mood normal.





Current Patient Data


Vital Signs





                                   Vital Signs








  Date Time  Temp Pulse Resp B/P (MAP) Pulse Ox O2 Delivery O2 Flow Rate FiO2


 


4/23/19 12:46 97.9 93 20 145/65 (91) 94 Room Air  





 97.9       








Lab Values





                                Laboratory Tests








Test


 4/23/19


12:50 4/23/19


12:55 4/23/19


15:15


 


White Blood Count


 7.4 x10^3/uL


(4.0-11.0) 


 





 


Red Blood Count


 4.32 x10^6/uL


(4.30-5.70) 


 





 


Hemoglobin


 10.6 g/dL


(13.0-17.5)  L 


 





 


Hematocrit


 33.7 %


(39.0-53.0)  L 


 





 


Mean Corpuscular Volume


 78 fL ()


L 


 





 


Mean Corpuscular Hemoglobin 25 pg (25-35)    


 


Mean Corpuscular Hemoglobin


Concent 31 g/dL


(31-37) 


 





 


Red Cell Distribution Width


 17.2 %


(11.5-14.5)  H 


 





 


Platelet Count


 239 x10^3/uL


(140-400) 


 





 


Neutrophils (%) (Auto) 68 % (31-73)    


 


Lymphocytes (%) (Auto) 18 % (24-48)  L  


 


Monocytes (%) (Auto) 12 % (0-9)  H  


 


Eosinophils (%) (Auto) 2 % (0-3)    


 


Basophils (%) (Auto) 1 % (0-3)    


 


Neutrophils # (Auto)


 5.0 x10^3uL


(1.8-7.7) 


 





 


Lymphocytes # (Auto)


 1.3 x10^3/uL


(1.0-4.8) 


 





 


Monocytes # (Auto)


 0.9 x10^3/uL


(0.0-1.1) 


 





 


Eosinophils # (Auto)


 0.1 x10^3/uL


(0.0-0.7) 


 





 


Basophils # (Auto)


 0.0 x10^3/uL


(0.0-0.2) 


 





 


Prothrombin Time


 13.5 SEC


(11.7-14.0) 


 





 


Prothrombin Time INR 1.1 (0.8-1.1)    


 


Sodium Level


 141 mmol/L


(136-145) 


 





 


Potassium Level


 4.4 mmol/L


(3.5-5.1) 


 





 


Chloride Level


 104 mmol/L


() 


 





 


Carbon Dioxide Level


 29 mmol/L


(21-32) 


 





 


Anion Gap 8 (6-14)    


 


Blood Urea Nitrogen


 17 mg/dL


(8-26) 


 





 


Creatinine


 1.2 mg/dL


(0.7-1.3) 


 





 


Estimated GFR


(Cockcroft-Gault) 61.0  


 


 





 


BUN/Creatinine Ratio 14 (6-20)    


 


Glucose Level


 150 mg/dL


(70-99)  H 


 





 


Calcium Level


 8.9 mg/dL


(8.5-10.1) 


 





 


Magnesium Level


 1.7 mg/dL


(1.8-2.4)  L 


 





 


Total Bilirubin


 0.3 mg/dL


(0.2-1.0) 


 





 


Aspartate Amino Transferase


(AST) 25 U/L (15-37)


 


 





 


Alanine Aminotransferase (ALT)


 23 U/L (16-63)


 


 





 


Alkaline Phosphatase


 113 U/L


() 


 





 


NT-Pro-B-Type Natriuretic


Peptide 195 pg/mL


(0-124)  H 


 





 


Total Protein


 6.4 g/dL


(6.4-8.2) 


 





 


Albumin


 3.1 g/dL


(3.4-5.0)  L 


 





 


Albumin/Globulin Ratio


 0.9 (1.0-1.7)


L 


 





 


Troponin I Quantitative


 


 0.041 ng/mL


(0.000-0.055) 0.033 ng/mL


(0.000-0.055)





                                Laboratory Tests


4/23/19 12:50








                                Laboratory Tests


4/23/19 12:50











EKG


EKG


Sinus rhythm 92 bpm, no ST elev or depr, nonischemic, QTc 503





Radiology/Procedures


Radiology/Procedures


[]





Course & Med Decision Making


Course & Med Decision Making


Pertinent Labs and Imaging studies reviewed. (See chart for details)





64 y/o M with h/o 4xCABG presents for cp and sob with exertion.


Labs show neg trop.


EKG no acute ischemia.


CT chest neg for PE.


I recommended obs admission for ACS rule out - pt requests 2nd opinion. 

Consulted Dr. Bob Medeiros. He recs dc home if delta trop neg.


Repeat trop neg. Dc home. Discussed return precautions.





Dragon Disclaimer


Dragon Disclaimer


This electronic medical record was generated, in whole or in part, using a voice

 recognition dictation system.





Departure


Departure


Impression:  


   Primary Impression:  


   Chest pain


Disposition:  01 HOME, SELF-CARE


Condition:  IMPROVED


Referrals:  


IRNO LAY MD (PCP)


Patient Instructions:  Chest Pain (Nonspecific)











LINDY BAH MD           Apr 23, 2019 12:52

## 2019-04-23 NOTE — RAD
CT ANGIOGRAPHY CHEST

 

Indication: CHEST PAIN, SOB, SENT TO THE ED FROM HIS DOCTOR<BR>IV OMNI 350

100 MLS<BR>PREVIOUS .

 

Technique: After intravenous contrast administration, CT imaging was 

performed of the chest. MIP reconstructions were obtained.

 

Exposure: One or more of the following individualized dose reduction 

techniques were utilized for this examination:  1. Automated exposure 

control  2. Adjustment of the mA and/or kV according to patient size  3. 

Use of iterative reconstruction technique.

 

No prior study available for comparison

 

Pulsatility artifact at the main pulmonary artery. No evidence of 

pulmonary embolism. The thoracic aorta is calcified without evidence of an

aneurysm. No evidence of dissection in the thoracic aorta. There is a 

stent or graft at the aortic valve. Coronary artery calcifications.

 

No significant lymph node enlargement. No pericardial effusion. Partially 

seen thyroid is symmetric. No significant pleural effusion.

 

There is no evidence of consolidating infiltrate or dominant mass in 

either lung. There are mild markings in both lungs particularly 

inferiorly, likely atelectasis. Bullae identified in the right lower lung.

Trachea and mainstem bronchi are patent.

 

Degenerative spondylosis of the spine. Vertebral body height is 

maintained.

 

Limited slices through the upper abdomen. These demonstrate no acute 

findings.

 

IMPRESSION:

1. No evidence of pulmonary embolism.

2. Atherosclerotic disease

3. No consolidating infiltrate in either lung.

 

Electronically signed by: Cruz Ayala MD (4/23/2019 1:53 PM) [facility]

## 2019-04-23 NOTE — EKG
[facility]

              [address] [place] [address]

Test Date:    2019               Test Time:    12:37:47

Pat Name:     ILIANA PINON          Department:   

Patient ID:   [id number]           Room:          

Gender:       M                        Technician:   

:          [id number]               Requested By: LINDY BAH

Order Number: [id number]           Reading MD:   Cem Parnell

                                 Measurements

Intervals                              Axis          

Rate:         92                       P:            31

SD:           188                      QRS:          58

QRSD:         116                      T:            24

QT:           402                                    

QTc:          503                                    

                           Interpretive Statements

SINUS RHYTHM

QRS(T) CONTOUR ABNORMALITY

CONSIDER ANTEROLATERAL MYOCARDIAL DAMAGE

PROLONGED QT

POSSIBLY ABNORMAL ECG

RI6.01          Unconfirmed report

Compared to ECG 2018 09:45:43

Prolonged QT interval now present

Right bundle-branch block no longer present



Electronically Signed On 2019 11:32:53 CDT by Cem Parnell

## 2019-07-26 ENCOUNTER — HOSPITAL ENCOUNTER (OUTPATIENT)
Dept: HOSPITAL 61 - ECHO | Age: 64
Discharge: HOME | End: 2019-07-26
Attending: INTERNAL MEDICINE
Payer: MEDICARE

## 2019-07-26 DIAGNOSIS — E11.9: ICD-10-CM

## 2019-07-26 DIAGNOSIS — Z87.891: ICD-10-CM

## 2019-07-26 DIAGNOSIS — Z95.1: ICD-10-CM

## 2019-07-26 DIAGNOSIS — I11.9: ICD-10-CM

## 2019-07-26 DIAGNOSIS — I65.23: Primary | ICD-10-CM

## 2019-07-26 DIAGNOSIS — I73.9: ICD-10-CM

## 2019-07-26 DIAGNOSIS — Z95.2: ICD-10-CM

## 2019-07-26 DIAGNOSIS — I25.10: ICD-10-CM

## 2019-07-26 PROCEDURE — C8929 TTE W OR WO FOL WCON,DOPPLER: HCPCS

## 2019-07-26 PROCEDURE — 93925 LOWER EXTREMITY STUDY: CPT

## 2019-07-26 PROCEDURE — 93880 EXTRACRANIAL BILAT STUDY: CPT

## 2019-07-26 NOTE — RAD
MR#: [id number]

Account#: [id number]

Accession#: [id number]

Date of Study: 07/26/2019

Ordering Physician: ISIS HARRELL, 

Referring Physician: ISIS HARRELL, 

Tech: Maribell Mcmahan, DANGELO, RVT, RTR





--------------- APPROVED REPORT --------------





Patient Location: OUT-PATIENT



Indications

PAD

CAD



Risk Factors

Hypertension

Diabetes                        



VELOCITY AND DOPPLER WAVEFORM ANALYSIS

RIGHT cm/secWaveformSeverity LEFT cm/secWaveform Severity 

pCFA 271.0pCFA 192.8

Prof Fem Art. 300.8Prof Fem Art. 98.1

Fem Art Prox. 206.3Fem Art Prox. 175.2

Fem Art Mid. 185.1Fem Art Mid. 258.1

Fem Art Dist. 89.2Fem Art Dist. 213.6

Pop Art(AK) 80.4Pop Art(AK) 95.9

PTA Prox. 49.7PTA Prox. 34.3

PTA Dist. 99.7PTA Dist. 79.9

Per Art Prox. 60.0Per Art Prox. 73.8

TAMI Prox. 45.1ATA Prox. 21.7

DPA 27DPA 15



Findings

Grayscale images demonstrate diffuse plaque.



Based on velocities and spectral imaging there is likely at least moderate stenosis involving the rig
ht CFA and left CFA.



Probable greater than 50% stenosis involving the left SFA.



There is three-vessel runoff below the knee with adequate velocities in the right posterior tibial, p
eroneal and anterior tibial arteries would likely diffuse mild-to-moderate disease.



On the left there is likely a greater than 50% stenosis involving the posterior tibial artery and ant
erior tibial artery with robust 1 vessel runoff in the form of the peroneal vessel.



Critical Notification

Critical Value: No



<Conclusion>

1. Moderate to severe disease involving the bilateral CFA, and left anterior and posterior tibial ves
sels.



Signed by : Isis Harrell, 

Electronically Approved : 07/26/2019 16:53:26

## 2019-07-26 NOTE — CARD
MR#: [id number]

Account#: [id number]

Accession#: [id number]

Date of Study: 07/26/2019

Ordering Physician: ISIS HARRELL, 

Referring Physician: ISIS HARRELL, 

Tech: Celine Valdes RDCS





--------------- APPROVED REPORT --------------





EXAM: Two-dimensional and M-mode echocardiogram with Doppler, color Doppler with contrast.



Other Information 

Quality : Technically LimitedHR: 85bpm

Rhythm : NSRTechnically limited study due to  body habitus, smoking, and CABG.



INDICATION

AVR



Echo Enhancing Agent

Indication: Endocardial border delineation

Agent/Amount Used: Optison 1mL



Surgery/Intervention

Status/Post Aortic Valve Replacement: 



2D DIMENSIONS 

RVDd3.0 (2.9-3.5cm)Left Atrium(2D)4.2 (1.6-4.0cm)

IVSd1.7 (0.7-1.1cm)Aortic Root(2D)2.8 (2.0-3.7cm)

LVDd4.6 (3.9-5.9cm)LVOT Diameter2.1 (1.8-2.4cm)

PWd1.7 (0.7-1.1cm)LVDs3.3 (2.5-4.0cm)

FS (%) 26.8 %SV50.1 ml

LVEF(%)53.0 (>50%)



M-Mode DIMENSIONS 

Left Atrium(MM)4.08 (2.5-4.0cm)Aortic Root3.09 (2.2-3.7cm)



Aortic Valve

AoV Peak Dante.215.9cm/sAoV VTI42.3cm

AO Peak GR.18.7mmHgLVOT Peak Dante.108.6cm/s

AO Mean GR.11mmHgAVA (VMAX)1.76cm2

ELIZABETH   (VTI)1.80cm2



Mitral Valve

MV E Qazlpbeb596.3cm/sMV E Peak Gr.9mmHg

MV DECEL [id number] A Uloppbja029.5cm/s

MV E Mean Gr.4mmHgE/A  Ratio1.5



Pulmonary Valve

PV Peak Wmqzvctp351.4cm/s



Tricuspid Valve

TR P. Kdivzmqn300pg/sRAP OXYUKGPS9xbTs

TR Peak Gr.56iuIfITFH35geTf



 LEFT VENTRICLE 

The left ventricle is normal size. There is moderate concentric left ventricular hypertrophy. The lef
t ventricular systolic function is normal and the ejection fraction is within normal range. The Eject
ion Fraction is 55-60%. There is grossly normal LV segmental wall motion. Difficult to visualize wall
 motion despite contrast use. Transmitral Doppler flow pattern is abnormal.



 RIGHT VENTRICLE 

The right ventricle is normal size. There is normal right ventricular wall thickness. The right ventr
icular systolic function is normal.



 ATRIA 

The left atrium is mildly dilated. The right atrium size is normal. The interatrial septum is intact 
with no evidence for an atrial septal defect or patent foramen ovale as noted on 2-D or Doppler imagi
ng.



 AORTIC VALVE 

There is a prosthetic aortic valve prosthesis. Doppler and Color Flow revealed no significant aortic 
regurgitation. Maximum pressure gradient of 19 mmHg and mean pressure gradient of 11 mmHg. There is a
 prosthetic aortic valve prosthesis. The prosthetic aortic valve is not well visualized.



 MITRAL VALVE 

The mitral valve is thickened but opens well. There is no evidence of mitral valve prolapse. There is
 no mitral valve stenosis. Doppler and Color-flow revealed trace mitral regurgitation.



 TRICUSPID VALVE 

The tricuspid valve is normal in structure and function. Doppler and Color Flow revealed trace tricus
pid regurgitation. The PA pressure was estimated at 21 mmHg. There is no tricuspid valve prolapse or 
vegetation. There is no tricuspid valve stenosis.



 PULMONIC VALVE 

The pulmonic valve is not well visualized.



 GREAT VESSELS 

The aortic root is normal in size. The ascending aorta is normal in size. The IVC was not visualized.




 PERICARDIAL EFFUSION 

There is no evidence of significant pericardial effusion.



Critical Notification

Critical Value: No



<Conclusion>

The left ventricular systolic function is normal and the ejection fraction is within normal range. Th
e Ejection Fraction is 55-60%.

There is grossly normal LV segmental wall motion.  Difficult to visualize wall motion despite contras
t use.

There is a prosthetic aortic valve prosthesis. The prosthetic aortic valve is not well visualized.

Maximum pressure gradient of 19 mmHg and mean pressure gradient of 11 mmHg.



Signed by : Isis Harrell, 

Electronically Approved : 07/26/2019 15:12:33

## 2019-07-26 NOTE — RAD
MR#: [id number]

Account#: [id number]

Accession#: [id number]

Date of Study: 07/26/2019

Ordering Physician: ISIS HARRELL,

Referring Physician: ISIS HARRELL,

Tech: Maribell Mcmahan, DANGELO, RVT, RTR





--------------- APPROVED REPORT --------------





Patient Location: OUT-PATIENT

Laterality:Bilateral



Indications

Carotid Stenosis in last scan



Risk Factors

Hypertension: 

Diabetes

PAD



Doppler Spectral Velocity Analysis

Right   Left    

pCCA     122/12 cm/spCCA     153/26 cm/s

mCCA     136/17 cm/smCCA     138/23 cm/s

dCCA     112/19 cm/sdCCA     114/17 cm/s

Bulb     107/15 cm/sBulb     116/22 cm/s

ECA      146/19 cm/sECA      222/25 cm/s

pICA     173/37 cm/spICA     136/21 cm/s

Daniel     127/29 cm/smICA     119/30 cm/s

dICA     132/30 cm/sdICA     65/17 cm/s

Vert.    112/225 cm/sVert.    24/6 cm/s

ICA/CCA  1.27ICA/CCA  0.89



Findings

Grayscale images of the bilateral carotid vasculature reveals diffuse moderate plaque.



The right internal carotid artery likely has a moderate stenosis approximately 50-69% by velocity cri
teria. Normal ICA to CCA ratios.



The left internal carotid artery also has likely a moderate stenosis in the 50-69% range. [place] grea
ter than 70% stenosis involving the left external carotid artery. Normal ICA to CCA ratio.



Patent vertebral arteries bilaterally.



Critical Notification

Critical Value: No



<Conclusion>

1. Moderate bilateral internal carotid artery disease.



Signed by : Isis Harrell, 

Electronically Approved : 07/26/2019 16:45:23

## 2019-08-12 ENCOUNTER — HOSPITAL ENCOUNTER (OUTPATIENT)
Dept: HOSPITAL 61 - KCIC US | Age: 64
Discharge: HOME | End: 2019-08-12
Attending: INTERNAL MEDICINE
Payer: MEDICARE

## 2019-08-12 DIAGNOSIS — M79.604: ICD-10-CM

## 2019-08-12 DIAGNOSIS — M79.605: ICD-10-CM

## 2019-08-12 DIAGNOSIS — M79.89: Primary | ICD-10-CM

## 2019-08-12 PROCEDURE — 93970 EXTREMITY STUDY: CPT

## 2019-08-12 NOTE — [facility]
Examination:  Lower Extremity Venous Doppler Ultrasound

 

History: Bilateral leg pain, swelling

 

Comparison: None

 

Procedure: Gray scale, color flow 2D and spectal waveform analysis images 

are obtained with and without compression in the area of the common 

femoral vein, superficial femoral vein - femoral vein junction, main 

femoral vein (superficial femoral vein) and popliteal vein. Veins of the 

proximal calf are also imaged.

 

Findings:

 

There is normal duplex flow, color flow and compressibility of all 

visualized vein segments. No evidence of deep venous thrombus is present.

 

Impression: 

 

No evidence of DVT in the bilateral lower extremity venous system.

 

Electronically signed by: Anderson Denis MD (8/12/2019 4:27 PM) [facility]

## 2019-09-25 ENCOUNTER — HOSPITAL ENCOUNTER (OUTPATIENT)
Dept: HOSPITAL 61 - MRI | Age: 64
Discharge: HOME | End: 2019-09-25
Attending: PODIATRIST
Payer: MEDICARE

## 2019-09-25 DIAGNOSIS — S93.412A: Primary | ICD-10-CM

## 2019-09-25 DIAGNOSIS — S93.492A: ICD-10-CM
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
## 2019-09-25 DIAGNOSIS — S86.012A: ICD-10-CM
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
## 2019-09-25 PROCEDURE — 73721 MRI JNT OF LWR EXTRE W/O DYE: CPT

## 2019-09-25 NOTE — RAD
MR of the left ankle

 

HISTORY: Achilles tendon pain.

 

TECHNIQUE: Routine multiplanar sequences are obtained.

 

FINDINGS:

 

1. Complete rupture of the Achilles tendon from the insertion, involving 

greater than 90 percent of the fibers. There is a very thin irregular 

remnant of the tendon maintaining continuity. The torn tendon is 

proximally retracted 3.5 cm. Heterogeneous fluid or hemorrhage within the 

retrocalcaneal bursa. Moderate size enthesophyte at the calcaneal 

insertion, with some tendinous calcaneal marrow edema.

 

Peroneal tendons are intact. Mild thickening and signal within the 

anterior talofibular ligament and calcaneofibular ligament compatible with

sprain. Posterior talofibular ligament is intact. The tibiofibular 

syndesmotic ligaments are intact.

 

Posterior tibial and flexor tendons are intact. No acute medial 

ligamentous tear.

 

Anterior tibial and extensor tendons are intact. No acute plantar 

fasciitis.

 

Subtalar joints are mildly degenerative but patent. Tarsal sinus is 

intact. Talar dome is intact.

 

Diffuse subcutaneous edema around the ankle.

 

IMPRESSION:

1. Near complete rupture of the Achilles tendon from the calcaneal 

insertion, with 3.5 cm proximal retraction.

2. Anterior talofibular and calcaneofibular ligament sprains.

 

Electronically signed by: Cruz Ayala MD (9/25/2019 4:39 PM) [facility]

## 2019-10-02 ENCOUNTER — HOSPITAL ENCOUNTER (INPATIENT)
Dept: HOSPITAL 61 - ER | Age: 64
LOS: 5 days | Discharge: NURSING, REHAB OR LONG-TERM CARE | DRG: 562 | End: 2019-10-07
Attending: INTERNAL MEDICINE | Admitting: INTERNAL MEDICINE
Payer: MEDICARE

## 2019-10-02 VITALS — BODY MASS INDEX: 37.19 KG/M2 | WEIGHT: 315 LBS | HEIGHT: 77 IN

## 2019-10-02 VITALS — DIASTOLIC BLOOD PRESSURE: 43 MMHG | SYSTOLIC BLOOD PRESSURE: 118 MMHG

## 2019-10-02 VITALS — DIASTOLIC BLOOD PRESSURE: 40 MMHG | SYSTOLIC BLOOD PRESSURE: 112 MMHG

## 2019-10-02 DIAGNOSIS — I10: ICD-10-CM

## 2019-10-02 DIAGNOSIS — E66.01: ICD-10-CM

## 2019-10-02 DIAGNOSIS — I25.10: ICD-10-CM

## 2019-10-02 DIAGNOSIS — Z95.1: ICD-10-CM

## 2019-10-02 DIAGNOSIS — E11.649: ICD-10-CM

## 2019-10-02 DIAGNOSIS — E78.00: ICD-10-CM

## 2019-10-02 DIAGNOSIS — Z83.3: ICD-10-CM

## 2019-10-02 DIAGNOSIS — F32.9: ICD-10-CM

## 2019-10-02 DIAGNOSIS — Z98.1: ICD-10-CM

## 2019-10-02 DIAGNOSIS — Z71.3: ICD-10-CM

## 2019-10-02 DIAGNOSIS — Z95.2: ICD-10-CM

## 2019-10-02 DIAGNOSIS — M79.7: ICD-10-CM
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
## 2019-10-02 DIAGNOSIS — G47.30: ICD-10-CM

## 2019-10-02 DIAGNOSIS — F41.9: ICD-10-CM

## 2019-10-02 DIAGNOSIS — E11.51: ICD-10-CM

## 2019-10-02 DIAGNOSIS — Z59.0: ICD-10-CM

## 2019-10-02 DIAGNOSIS — Z88.8: ICD-10-CM

## 2019-10-02 DIAGNOSIS — K21.9: ICD-10-CM
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
## 2019-10-02 DIAGNOSIS — I48.91: ICD-10-CM

## 2019-10-02 DIAGNOSIS — S86.011A: ICD-10-CM

## 2019-10-02 DIAGNOSIS — N17.0: ICD-10-CM

## 2019-10-02 DIAGNOSIS — M19.90: ICD-10-CM
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
## 2019-10-02 DIAGNOSIS — J44.9: ICD-10-CM

## 2019-10-02 DIAGNOSIS — E86.0: ICD-10-CM

## 2019-10-02 DIAGNOSIS — E78.5: ICD-10-CM

## 2019-10-02 DIAGNOSIS — S86.012A: Primary | ICD-10-CM

## 2019-10-02 DIAGNOSIS — Z95.0: ICD-10-CM

## 2019-10-02 LAB
ALBUMIN SERPL-MCNC: 3.5 G/DL (ref 3.4–5)
ALBUMIN/GLOB SERPL: 0.8 {RATIO} (ref 1–1.7)
ALP SERPL-CCNC: 122 U/L (ref 46–116)
ALT SERPL-CCNC: 23 U/L (ref 16–63)
ANION GAP SERPL CALC-SCNC: 6 MMOL/L (ref 6–14)
APTT PPP: YELLOW S
AST SERPL-CCNC: 28 U/L (ref 15–37)
BACTERIA #/AREA URNS HPF: (no result) /HPF
BASOPHILS # BLD AUTO: 0.1 X10^3/UL (ref 0–0.2)
BASOPHILS NFR BLD: 1 % (ref 0–3)
BILIRUB SERPL-MCNC: 0.4 MG/DL (ref 0.2–1)
BILIRUB UR QL STRIP: NEGATIVE
BUN SERPL-MCNC: 30 MG/DL (ref 8–26)
BUN/CREAT SERPL: 18 (ref 6–20)
CALCIUM SERPL-MCNC: 9.6 MG/DL (ref 8.5–10.1)
CHLORIDE SERPL-SCNC: 99 MMOL/L (ref 98–107)
CO2 SERPL-SCNC: 33 MMOL/L (ref 21–32)
CREAT SERPL-MCNC: 1.7 MG/DL (ref 0.7–1.3)
CRP SERPL-MCNC: 20.2 MG/L (ref 0–3.3)
EOSINOPHIL NFR BLD: 0.1 X10^3/UL (ref 0–0.7)
EOSINOPHIL NFR BLD: 1 % (ref 0–3)
ERYTHROCYTE [DISTWIDTH] IN BLOOD BY AUTOMATED COUNT: 19 % (ref 11.5–14.5)
FIBRINOGEN PPP-MCNC: CLEAR MG/DL
GFR SERPLBLD BASED ON 1.73 SQ M-ARVRAT: 40.7 ML/MIN
GLOBULIN SER-MCNC: 4.2 G/DL (ref 2.2–3.8)
GLUCOSE SERPL-MCNC: 85 MG/DL (ref 70–99)
HCT VFR BLD CALC: 37.7 % (ref 39–53)
HGB BLD-MCNC: 12.1 G/DL (ref 13–17.5)
HYALINE CASTS #/AREA URNS LPF: (no result) /HPF
LYMPHOCYTES # BLD: 1.4 X10^3/UL (ref 1–4.8)
LYMPHOCYTES NFR BLD AUTO: 12 % (ref 24–48)
MAGNESIUM SERPL-MCNC: 1.8 MG/DL (ref 1.8–2.4)
MCH RBC QN AUTO: 25 PG (ref 25–35)
MCHC RBC AUTO-ENTMCNC: 32 G/DL (ref 31–37)
MCV RBC AUTO: 79 FL (ref 79–100)
MONO #: 1.1 X10^3/UL (ref 0–1.1)
MONOCYTES NFR BLD: 10 % (ref 0–9)
NEUT #: 8.7 X10^3/UL (ref 1.8–7.7)
NEUTROPHILS NFR BLD AUTO: 76 % (ref 31–73)
NITRITE UR QL STRIP: NEGATIVE
PH UR STRIP: 5 [PH]
PLATELET # BLD AUTO: 272 X10^3/UL (ref 140–400)
POTASSIUM SERPL-SCNC: 4.4 MMOL/L (ref 3.5–5.1)
PROT SERPL-MCNC: 7.7 G/DL (ref 6.4–8.2)
PROT UR STRIP-MCNC: NEGATIVE MG/DL
RBC # BLD AUTO: 4.76 X10^6/UL (ref 4.3–5.7)
RBC #/AREA URNS HPF: (no result) /HPF (ref 0–2)
SODIUM SERPL-SCNC: 138 MMOL/L (ref 136–145)
SQUAMOUS #/AREA URNS LPF: (no result) /LPF
UROBILINOGEN UR-MCNC: 0.2 MG/DL
WBC # BLD AUTO: 11.3 X10^3/UL (ref 4–11)

## 2019-10-02 PROCEDURE — 80069 RENAL FUNCTION PANEL: CPT

## 2019-10-02 PROCEDURE — 82962 GLUCOSE BLOOD TEST: CPT

## 2019-10-02 PROCEDURE — 94660 CPAP INITIATION&MGMT: CPT

## 2019-10-02 PROCEDURE — 86140 C-REACTIVE PROTEIN: CPT

## 2019-10-02 PROCEDURE — 87086 URINE CULTURE/COLONY COUNT: CPT

## 2019-10-02 PROCEDURE — G0379 DIRECT REFER HOSPITAL OBSERV: HCPCS

## 2019-10-02 PROCEDURE — 80048 BASIC METABOLIC PNL TOTAL CA: CPT

## 2019-10-02 PROCEDURE — 5A09357 ASSISTANCE WITH RESPIRATORY VENTILATION, LESS THAN 24 CONSECUTIVE HOURS, CONTINUOUS POSITIVE AIRWAY PRESSURE: ICD-10-PCS | Performed by: INTERNAL MEDICINE

## 2019-10-02 PROCEDURE — G0378 HOSPITAL OBSERVATION PER HR: HCPCS

## 2019-10-02 PROCEDURE — 80053 COMPREHEN METABOLIC PANEL: CPT

## 2019-10-02 PROCEDURE — 81001 URINALYSIS AUTO W/SCOPE: CPT

## 2019-10-02 PROCEDURE — 83735 ASSAY OF MAGNESIUM: CPT

## 2019-10-02 PROCEDURE — 36415 COLL VENOUS BLD VENIPUNCTURE: CPT

## 2019-10-02 PROCEDURE — 85025 COMPLETE CBC W/AUTO DIFF WBC: CPT

## 2019-10-02 RX ADMIN — OXYCODONE HYDROCHLORIDE AND ACETAMINOPHEN PRN TAB: 10; 325 TABLET ORAL at 23:03

## 2019-10-02 RX ADMIN — TRAZODONE HYDROCHLORIDE PRN MG: 50 TABLET ORAL at 23:02

## 2019-10-02 RX ADMIN — ATORVASTATIN CALCIUM SCH MG: 40 TABLET, FILM COATED ORAL at 23:02

## 2019-10-02 SDOH — ECONOMIC STABILITY - HOUSING INSECURITY: HOMELESSNESS: Z59.0

## 2019-10-02 NOTE — PHYS DOC
Past Medical History


Past Medical History:  Arthritis, CAD, Depression, Diabetes-Type II, GERD, High 

Cholesterol, Hypertension, Other


Additional Past Medical Histor:  neuropathy,SLEEP APNEA, obesity


 (BREANA CULVER)


Past Surgical History:  Angioplasty, Coronary Bypass Surgery, Pacemaker, Other


Additional Past Surgical Histo:  bilat great toes/knee sx,L elbow,neck 

fusion,STENT,VALVE,ANAL FISSURE


 (BREANA CULVER)


Alcohol Use:  None


Drug Use:  None


 (BREANA CULVER)


Attending Signature


I have participated in the care of this patient and I have reviewed and agree 

with all pertinent clinical information above including history, exam, and 

recommendations.





 (LUPE HUNTER MD)





Adult General


Chief Complaint


Chief Complaint:  HYPOGLYCEMIA





[facility]


HPI





Patient is a 64  year old male, brought to the emergency department by EMS with 

complaint of hypoglycemia. Patient called EMS because he has bilateral Achilles 

tendon ruptures and is unable to walk. He requests to be placed in a rehab fa

cility as he is non-ambulatory. [facility] He was casted on his left lower extremity for the left ruptured 

Achilles tendon yesterday. PT states last night when he was going up the steps 

he felt his right Achilles tendon rupture. Patient states he has not had an MRI 

to evaluate the rupture yet. He currently rates his pain as 7 out of 10 on pain 

scale and both of his lower extremities. On EMS arrival patient's blood sugar 

was 45, he ate a candy bar and his blood sugar eliel to 74. Currently, on arrival

to the emergency room his blood sugars 76. Patient eyes any other complaints, 

reports a history of diabetes, high blood pressure, high cholesterol, coronary 

artery disease, arthritis, depression, and anxiety.


 (BREANA CULVER)





Review of Systems


Review of Systems





Constitutional: Denies fever or chills []


Eyes: Denies change in visual acuity, redness, or eye pain []


HENT: Denies nasal congestion or sore throat []


Respiratory: Denies cough or shortness of breath []


Cardiovascular: No additional information not addressed in HPI []


GI: Denies abdominal pain, nausea, vomiting, or diarrhea []


: Denies dysuria or hematuria []


Musculoskeletal: Denies back pain; see HPI []


Integument: Denies rash or skin lesions []


Neurologic: Denies headache


Endocrine: Denies polyuria or polydipsia; see HPI]





Complete systems were reviewed and found to be within normal limits, except as 

documented in this note.


 (BREANA CULVER)





Allergies


Allergies





Allergies








Coded Allergies Type Severity Reaction Last Updated Verified


 


  cortisone Allergy Intermediate Pt had swelling at injection site 12/17/15 Yes





 (LUPE HUNTER MD)





Physical Exam


Physical Exam





Constitutional: Well developed, well nourished, no acute distress, non-toxic 

appearance, obese. []


HENT: Normocephalic, atraumatic, bilateral external ears normal, oropharynx 

moist, no oral exudates, nose normal. []


Eyes: PERRLA, EOMI, conjunctiva normal, no discharge. [] 


Neck: Normal range of motion, no stridor. [] 


Cardiovascular:Heart rate regular rhythm


Lungs & Thorax:  Bilateral breath sounds clear to auscultation []


Abdomen: Bowel sounds normal, soft, no tenderness, no masses, no pulsatile 

masses. [] 


Skin: Warm, dry, no erythema, no rash. [] 


Back: No tenderness, no CVA tenderness. [] 


Extremities: No cyanosis, no edema; LLE in cast, RLE in ortho boot


Neurologic: Alert and oriented X 3, no focal deficits noted. []


Psychologic: Affect normal, judgement normal, mood normal. []


 (BREANA CULVER)





Current Patient Data


Vital Signs





                                   Vital Signs








  Date Time  Temp Pulse Resp B/P (MAP) Pulse Ox O2 Delivery O2 Flow Rate FiO2


 


10/2/19 19:05  94 16 138/61 (86) 97   


 


10/2/19 18:21      Room Air  


 


10/2/19 17:30 98.3       





 98.3       





 (LUPE HUNTER MD)


Lab Values





                                Laboratory Tests








Test


 10/2/19


17:34 10/2/19


18:00 10/2/19


18:42 10/2/19


19:35


 


Glucose (Fingerstick)


 76 mg/dL


(70-99) 


 96 mg/dL


(70-99) 





 


White Blood Count


 


 11.3 x10^3/uL


(4.0-11.0)  H 


 





 


Red Blood Count


 


 4.76 x10^6/uL


(4.30-5.70) 


 





 


Hemoglobin


 


 12.1 g/dL


(13.0-17.5)  L 


 





 


Hematocrit


 


 37.7 %


(39.0-53.0)  L 


 





 


Mean Corpuscular Volume


 


 79 fL ()


 


 





 


Mean Corpuscular Hemoglobin  25 pg (25-35)    


 


Mean Corpuscular Hemoglobin


Concent 


 32 g/dL


(31-37) 


 





 


Red Cell Distribution Width


 


 19.0 %


(11.5-14.5)  H 


 





 


Platelet Count


 


 272 x10^3/uL


(140-400) 


 





 


Neutrophils (%) (Auto)  76 % (31-73)  H  


 


Lymphocytes (%) (Auto)  12 % (24-48)  L  


 


Monocytes (%) (Auto)  10 % (0-9)  H  


 


Eosinophils (%) (Auto)  1 % (0-3)    


 


Basophils (%) (Auto)  1 % (0-3)    


 


Neutrophils # (Auto)


 


 8.7 x10^3/uL


(1.8-7.7)  H 


 





 


Lymphocytes # (Auto)


 


 1.4 x10^3/uL


(1.0-4.8) 


 





 


Monocytes # (Auto)


 


 1.1 x10^3/uL


(0.0-1.1) 


 





 


Eosinophils # (Auto)


 


 0.1 x10^3/uL


(0.0-0.7) 


 





 


Basophils # (Auto)


 


 0.1 x10^3/uL


(0.0-0.2) 


 





 


Sodium Level


 


 138 mmol/L


(136-145) 


 





 


Potassium Level


 


 4.4 mmol/L


(3.5-5.1) 


 





 


Chloride Level


 


 99 mmol/L


() 


 





 


Carbon Dioxide Level


 


 33 mmol/L


(21-32)  H 


 





 


Anion Gap  6 (6-14)    


 


Blood Urea Nitrogen


 


 30 mg/dL


(8-26)  H 


 





 


Creatinine


 


 1.7 mg/dL


(0.7-1.3)  H 


 





 


Estimated GFR


(Cockcroft-Gault) 


 40.7  


 


 





 


BUN/Creatinine Ratio  18 (6-20)    


 


Glucose Level


 


 85 mg/dL


(70-99) 


 





 


Calcium Level


 


 9.6 mg/dL


(8.5-10.1) 


 





 


Magnesium Level


 


 1.8 mg/dL


(1.8-2.4) 


 





 


Total Bilirubin


 


 0.4 mg/dL


(0.2-1.0) 


 





 


Aspartate Amino Transferase


(AST) 


 28 U/L (15-37)


 


 





 


Alanine Aminotransferase (ALT)


 


 23 U/L (16-63)


 


 





 


Alkaline Phosphatase


 


 122 U/L


()  H 


 





 


C-Reactive Protein,


Quantitative 


 20.2 mg/L


(0-3.3)  H 


 





 


Total Protein


 


 7.7 g/dL


(6.4-8.2) 


 





 


Albumin


 


 3.5 g/dL


(3.4-5.0) 


 





 


Albumin/Globulin Ratio


 


 0.8 (1.0-1.7)


L 


 





 


Urine Collection Type    Unknown  


 


Urine Color    Yellow  


 


Urine Clarity    Clear  


 


Urine pH    5.0  


 


Urine Specific Gravity    1.020  


 


Urine Protein


 


 


 


 Negative mg/dL


(NEG-TRACE)


 


Urine Glucose (UA)


 


 


 


 Negative mg/dL


(NEG)


 


Urine Ketones (Stick)


 


 


 


 Negative mg/dL


(NEG)


 


Urine Blood


 


 


 


 Negative (NEG)





 


Urine Nitrite


 


 


 


 Negative (NEG)





 


Urine Bilirubin


 


 


 


 Negative (NEG)





 


Urine Urobilinogen Dipstick


 


 


 


 0.2 mg/dL (0.2


mg/dL)


 


Urine Leukocyte Esterase


 


 


 


 Negative (NEG)





 


Urine RBC


 


 


 


 6-10 /HPF


(0-2)


 


Urine WBC


 


 


 


 11-20 /HPF


(0-4)


 


Urine Squamous Epithelial


Cells 


 


 


 Mod /LPF  





 


Urine Bacteria


 


 


 


 Moderate /HPF


(0-FEW)


 


Urine Hyaline Casts    Many /HPF  


 


Urine Mucus    Marked /LPF  





                                Laboratory Tests


10/2/19 18:00








                                Laboratory Tests


10/2/19 18:00








 (LUPE HUNTER MD)





EKG


EKG


[]


 (BREANA CULVER)





Radiology/Procedures


Radiology/Procedures


[]


 (BREANA CULVER)





Course & Med Decision Making


Course & Med Decision Making


Pertinent Labs and Imaging studies reviewed. (See chart for details)


dx: hypoglycemia, inability to ambulate


1911- Spoke with Dr. Pena who is the admitting physician, and care was assumed

 following discussion of patient. Pt admitted as observation patient for 

hypoglycemia and inability to ambulate. 


Patient's vital signs stable, patient remains afebrile, appears nontoxic, 

respirations even and unlabored. Patient will be admitted to the med/surg floor.

 Patient's case and plan of care also discussed with Dr. Hunter


[]


 (BREANA CULVER)





Dragon Disclaimer


Dragon Disclaimer


This electronic medical record was generated, in whole or in part, using a voice

 recognition dictation system.


 (BREANA CULVER)





Departure


Departure


Impression:  


   Primary Impression:  


   Hypoglycemia


   Additional Impression:  


   Unable to walk


Disposition:  09 ADMITTED AS INPATIENT


Admitting Physician:  HIMS ([place])


 (BREANA CULVER)


Condition:  STABLE


Referrals:  


IRON LAY MD (PCP)





Problem Qualifiers











BREANA CULVER        Oct 2, 2019 19:17


LUPE HUNTER MD               Oct 3, 2019 03:00

## 2019-10-02 NOTE — NUR
The patient, ILIANA PINON, 63 y/o, M admitted by JEFF SILVA III, DO, was given 
written information regarding hospital policies, unit procedures and contact persons.  



Valuables were checked and Assessment was complete, will continue to monitor

## 2019-10-03 VITALS — SYSTOLIC BLOOD PRESSURE: 103 MMHG | DIASTOLIC BLOOD PRESSURE: 66 MMHG

## 2019-10-03 VITALS — SYSTOLIC BLOOD PRESSURE: 137 MMHG | DIASTOLIC BLOOD PRESSURE: 44 MMHG

## 2019-10-03 VITALS — DIASTOLIC BLOOD PRESSURE: 57 MMHG | SYSTOLIC BLOOD PRESSURE: 114 MMHG

## 2019-10-03 VITALS — SYSTOLIC BLOOD PRESSURE: 98 MMHG | DIASTOLIC BLOOD PRESSURE: 41 MMHG

## 2019-10-03 VITALS — SYSTOLIC BLOOD PRESSURE: 117 MMHG | DIASTOLIC BLOOD PRESSURE: 43 MMHG

## 2019-10-03 VITALS — DIASTOLIC BLOOD PRESSURE: 52 MMHG | SYSTOLIC BLOOD PRESSURE: 136 MMHG

## 2019-10-03 PROCEDURE — 5A09357 ASSISTANCE WITH RESPIRATORY VENTILATION, LESS THAN 24 CONSECUTIVE HOURS, CONTINUOUS POSITIVE AIRWAY PRESSURE: ICD-10-PCS | Performed by: INTERNAL MEDICINE

## 2019-10-03 RX ADMIN — OXYCODONE HYDROCHLORIDE AND ACETAMINOPHEN PRN TAB: 10; 325 TABLET ORAL at 23:31

## 2019-10-03 RX ADMIN — TRAZODONE HYDROCHLORIDE PRN MG: 50 TABLET ORAL at 23:30

## 2019-10-03 RX ADMIN — INSULIN LISPRO SCH UNITS: 100 INJECTION, SOLUTION INTRAVENOUS; SUBCUTANEOUS at 16:30

## 2019-10-03 RX ADMIN — INSULIN GLARGINE SCH UNIT: 100 INJECTION, SOLUTION SUBCUTANEOUS at 20:45

## 2019-10-03 RX ADMIN — VITAMIN D, TAB 1000IU (100/BT) SCH UNIT: 25 TAB at 10:07

## 2019-10-03 RX ADMIN — ACETAMINOPHEN SCH MG: 325 TABLET, FILM COATED ORAL at 20:36

## 2019-10-03 RX ADMIN — DOCUSATE SODIUM SCH MG: 100 CAPSULE, LIQUID FILLED ORAL at 10:06

## 2019-10-03 RX ADMIN — OXYCODONE HYDROCHLORIDE AND ACETAMINOPHEN PRN TAB: 10; 325 TABLET ORAL at 09:03

## 2019-10-03 RX ADMIN — INSULIN LISPRO SCH UNITS: 100 INJECTION, SOLUTION INTRAVENOUS; SUBCUTANEOUS at 10:13

## 2019-10-03 RX ADMIN — DOCUSATE SODIUM SCH MG: 100 CAPSULE, LIQUID FILLED ORAL at 20:37

## 2019-10-03 RX ADMIN — INSULIN LISPRO SCH UNITS: 100 INJECTION, SOLUTION INTRAVENOUS; SUBCUTANEOUS at 12:39

## 2019-10-03 RX ADMIN — BUPROPION HYDROCHLORIDE SCH MG: 150 TABLET, FILM COATED, EXTENDED RELEASE ORAL at 10:06

## 2019-10-03 RX ADMIN — CITALOPRAM HYDROBROMIDE SCH MG: 20 TABLET ORAL at 10:07

## 2019-10-03 RX ADMIN — ATORVASTATIN CALCIUM SCH MG: 40 TABLET, FILM COATED ORAL at 20:37

## 2019-10-03 RX ADMIN — LISINOPRIL SCH MG: 5 TABLET ORAL at 10:07

## 2019-10-03 RX ADMIN — BUPROPION HYDROCHLORIDE SCH MG: 150 TABLET, FILM COATED, EXTENDED RELEASE ORAL at 20:37

## 2019-10-03 RX ADMIN — FUROSEMIDE SCH MG: 80 TABLET ORAL at 10:06

## 2019-10-03 NOTE — NUR
Patient complaints of feeling "dizzy" VS taken BP 91/35 HR 83 O2 97 BS 98. MD Feng 
notified new orders received. Will continue to monitor.

## 2019-10-03 NOTE — HP
ADMIT DATE:  10/02/2019



CHIEF COMPLAINT:  Ankle pain, leg pain, and fall.



HISTORY OF PRESENT ILLNESS:  The patient is a pleasant 64-year-old male who

ruptured his left Achilles tendon in the recent past.  Since then, he has had a

cast on that leg.  He has got some wounds now.  Today, he got up and fell and he

ruptured his right Achilles tendon.  I discussed the case with ER physician.  We

are going to admit the patient and consult Orthopedics.



PAST MEDICAL HISTORY:  Hypertension, hyperlipidemia, obesity, previous Achilles

tendon rupture on the left, coronary bypass surgery, peripheral vascular

disease, diabetes.



ALLERGIES:  None.



FAMILY HISTORY:  Diabetes.



SOCIAL HISTORY:  He is .  He does not drink, smoke or take drugs.  He is

retired.



MEDICATIONS:  Reviewed, please refer to the MRAD.



REVIEW OF SYSTEMS:  

GENERAL:  No history of weight change, weakness or fevers.

SKIN:  No bruising, hair changes or rashes.

EYES:  No blurred, double or loss of vision.

NOSE AND THROAT:  No history of nosebleeds, hoarseness or sore throat.

HEART:  No history of palpitations, chest pain or shortness of breath on

exertion.

LUNGS:  Denies cough, hemoptysis, wheezing or shortness of breath.

GASTROINTESTINAL:  Denies changes in appetite, nausea, vomiting, diarrhea or

constipation.

GENITOURINARY:  No history of frequency, urgency, hesitancy or nocturia.

NEUROLOGIC:  Denies history of numbness, tingling, tremor or weakness.

PSYCHIATRIC:  No history of panic, anxiety or depression.

ENDOCRINE:  No history of heat or cold intolerance, polyuria or polydipsia.

EXTREMITIES:  He complains of leg pain bilaterally.



PHYSICAL EXAMINATION:

VITALS:  Within normal limits and are stable.

GENERAL:  No apparent distress.  Alert and oriented.

HEENT:  Head is normocephalic, atraumatic, pupils were equally round and

reactive to light and accommodation.

NECK:  Supple, no JVD, no thyromegaly was noted.

LUNGS:  Clear to auscultation in all lung fields without rhonchi or wheezing.

HEART:  RRR, S1, S2 present.  Peripheral pulses intact, no obvious murmurs were

noted.

ABDOMEN:  Soft, nontender.  Positive bowel sounds no organomegaly, normal bowel

sounds.

EXTREMITIES:  He has cast on the left leg and the right foot and ankle are

tender to touch.

NEUROLOGIC:  Normal speech, normal tone.  A and O x3, moves all extremities, no

obvious focal deficits.

PSYCHIATRIC:  Normal affect, normal mood.  Stable.

SKIN:  No ulcerations or rashes, good skin turgor, no jaundice.

VASCULAR:  Good capillary refill, neurovascular bundle appears to be intact.



ASSESSMENT AND PLAN:  Bilateral Achilles tendon ruptures.  The patient has been

admitted.  We will consult Orthopedics.  PT, OT, home meds, and DVT prophylaxis.

 

Full code.  Skilled nursing unit evaluation.

 



______________________________

JEFF SILVA DO



DR:  EILEEN/miguelito  JOB#:  [id number] / [id number]

DD:  10/02/2019 23:47  DT:  10/03/2019 00:09

## 2019-10-03 NOTE — PDOC
PROGRESS NOTES


Chief Complaint


Chief Complaint


A/P:


Achilles tendon injury - he felt something snap in his right heel and has pain 

and weakness on dorsiflexion. No quinolone exposure recently that he knows of


Unable to walk - 2/2 cast on LLE and right leg weakness


Hypertension - cont meds


Hyperlipidemia - statin


Obesity - morbid obesity, counseled on weight loss, consider GLP-1 or SGLT-2


H/o Achilles tendon rupture on the left - in cast currently


CAD - s/p 4x Coronary bypass surgery 4 years ago


Peripheral vascular disease - noted with medical management of bilateral lower 

extremity disease on non-occlusive studies in 2016


Diabetes - seeing clypd, was recently started on U-500, has had 

daily hypoglycemic episodes into the 30s and 40s. Will change him back to a 

lantus BID and lispro TID schedule with sliding scale while in house. Consider 

SGLT-2 or GLP-1





History of Present Illness


History of Present Illness


Mr Mercedes is a 64-year-old male w/ PMHx hypertension, hyperlipidemia, obesity, 

previous Achilles tendon rupture on the left, CAD s/p coronary bypass surgery, 

peripheral vascular disease, diabetes who has been recovering in a cast from his

left achilles rupture who got up and fell and he


ruptured his right Achilles tendon. He is unable to walk and will be admitted 

for further treatment and diagnosis.





Vitals


Vitals





Vital Signs








  Date Time  Temp Pulse Resp B/P (MAP) Pulse Ox O2 Delivery O2 Flow Rate FiO2


 


10/3/19 07:15 98.1 85 22 136/52 (80) 95 Room Air  





 98.1       











Physical Exam


General:  Alert, Oriented X3, Cooperative


Heart:  Regular rate, Normal S1, Normal S2


Lungs:  Clear, Other


Abdomen:  Normal bowel sounds, Soft


Extremities:  Other (Multiple lesions on bilateral legs)





Labs


LABS





Laboratory Tests








Test


 10/2/19


17:34 10/2/19


18:00 10/2/19


18:42 10/2/19


19:35


 


Glucose (Fingerstick)


 76 mg/dL


(70-99) 


 96 mg/dL


(70-99) 





 


White Blood Count


 


 11.3 x10^3/uL


(4.0-11.0) 


 





 


Red Blood Count


 


 4.76 x10^6/uL


(4.30-5.70) 


 





 


Hemoglobin


 


 12.1 g/dL


(13.0-17.5) 


 





 


Hematocrit


 


 37.7 %


(39.0-53.0) 


 





 


Mean Corpuscular Volume  79 fL ()   


 


Mean Corpuscular Hemoglobin  25 pg (25-35)   


 


Mean Corpuscular Hemoglobin


Concent 


 32 g/dL


(31-37) 


 





 


Red Cell Distribution Width


 


 19.0 %


(11.5-14.5) 


 





 


Platelet Count


 


 272 x10^3/uL


(140-400) 


 





 


Neutrophils (%) (Auto)  76 % (31-73)   


 


Lymphocytes (%) (Auto)  12 % (24-48)   


 


Monocytes (%) (Auto)  10 % (0-9)   


 


Eosinophils (%) (Auto)  1 % (0-3)   


 


Basophils (%) (Auto)  1 % (0-3)   


 


Neutrophils # (Auto)


 


 8.7 x10^3/uL


(1.8-7.7) 


 





 


Lymphocytes # (Auto)


 


 1.4 x10^3/uL


(1.0-4.8) 


 





 


Monocytes # (Auto)


 


 1.1 x10^3/uL


(0.0-1.1) 


 





 


Eosinophils # (Auto)


 


 0.1 x10^3/uL


(0.0-0.7) 


 





 


Basophils # (Auto)


 


 0.1 x10^3/uL


(0.0-0.2) 


 





 


Sodium Level


 


 138 mmol/L


(136-145) 


 





 


Potassium Level


 


 4.4 mmol/L


(3.5-5.1) 


 





 


Chloride Level


 


 99 mmol/L


() 


 





 


Carbon Dioxide Level


 


 33 mmol/L


(21-32) 


 





 


Anion Gap  6 (6-14)   


 


Blood Urea Nitrogen


 


 30 mg/dL


(8-26) 


 





 


Creatinine


 


 1.7 mg/dL


(0.7-1.3) 


 





 


Estimated GFR


(Cockcroft-Gault) 


 40.7 


 


 





 


BUN/Creatinine Ratio  18 (6-20)   


 


Glucose Level


 


 85 mg/dL


(70-99) 


 





 


Calcium Level


 


 9.6 mg/dL


(8.5-10.1) 


 





 


Magnesium Level


 


 1.8 mg/dL


(1.8-2.4) 


 





 


Total Bilirubin


 


 0.4 mg/dL


(0.2-1.0) 


 





 


Aspartate Amino Transf


(AST/SGOT) 


 28 U/L (15-37) 


 


 





 


Alanine Aminotransferase


(ALT/SGPT) 


 23 U/L (16-63) 


 


 





 


Alkaline Phosphatase


 


 122 U/L


() 


 





 


C-Reactive Protein,


Quantitative 


 20.2 mg/L


(0-3.3) 


 





 


Total Protein


 


 7.7 g/dL


(6.4-8.2) 


 





 


Albumin


 


 3.5 g/dL


(3.4-5.0) 


 





 


Albumin/Globulin Ratio  0.8 (1.0-1.7)   


 


Urine Collection Type    Unknown 


 


Urine Color    Yellow 


 


Urine Clarity    Clear 


 


Urine pH    5.0 


 


Urine Specific Gravity    1.020 


 


Urine Protein


 


 


 


 Negative mg/dL


(NEG-TRACE)


 


Urine Glucose (UA)


 


 


 


 Negative mg/dL


(NEG)


 


Urine Ketones (Stick)


 


 


 


 Negative mg/dL


(NEG)


 


Urine Blood    Negative (NEG) 


 


Urine Nitrite    Negative (NEG) 


 


Urine Bilirubin    Negative (NEG) 


 


Urine Urobilinogen Dipstick


 


 


 


 0.2 mg/dL (0.2


mg/dL)


 


Urine Leukocyte Esterase    Negative (NEG) 


 


Urine RBC


 


 


 


 6-10 /HPF


(0-2)


 


Urine WBC


 


 


 


 11-20 /HPF


(0-4)


 


Urine Squamous Epithelial


Cells 


 


 


 Mod /LPF 





 


Urine Bacteria


 


 


 


 Moderate /HPF


(0-FEW)


 


Urine Hyaline Casts    Many /HPF 


 


Urine Mucus    Marked /LPF 


 


Test


 10/2/19


22:49 10/3/19


07:09 


 





 


Glucose (Fingerstick)


 159 mg/dL


(70-99) 188 mg/dL


(70-99) 


 














Assessment and Plan


Assessmemt and Plan


Problems


Medical Problems:


(1) Hypoglycemia


Status: Acute  





(2) Unable to walk


Status: Acute  











Comment


Review of Relevant


I have reviewed the following items jarrod (where applicable) has been applied.


Labs





Laboratory Tests








Test


 10/2/19


17:34 10/2/19


18:00 10/2/19


18:42 10/2/19


19:35


 


Glucose (Fingerstick)


 76 mg/dL


(70-99) 


 96 mg/dL


(70-99) 





 


White Blood Count


 


 11.3 x10^3/uL


(4.0-11.0) 


 





 


Red Blood Count


 


 4.76 x10^6/uL


(4.30-5.70) 


 





 


Hemoglobin


 


 12.1 g/dL


(13.0-17.5) 


 





 


Hematocrit


 


 37.7 %


(39.0-53.0) 


 





 


Mean Corpuscular Volume  79 fL ()   


 


Mean Corpuscular Hemoglobin  25 pg (25-35)   


 


Mean Corpuscular Hemoglobin


Concent 


 32 g/dL


(31-37) 


 





 


Red Cell Distribution Width


 


 19.0 %


(11.5-14.5) 


 





 


Platelet Count


 


 272 x10^3/uL


(140-400) 


 





 


Neutrophils (%) (Auto)  76 % (31-73)   


 


Lymphocytes (%) (Auto)  12 % (24-48)   


 


Monocytes (%) (Auto)  10 % (0-9)   


 


Eosinophils (%) (Auto)  1 % (0-3)   


 


Basophils (%) (Auto)  1 % (0-3)   


 


Neutrophils # (Auto)


 


 8.7 x10^3/uL


(1.8-7.7) 


 





 


Lymphocytes # (Auto)


 


 1.4 x10^3/uL


(1.0-4.8) 


 





 


Monocytes # (Auto)


 


 1.1 x10^3/uL


(0.0-1.1) 


 





 


Eosinophils # (Auto)


 


 0.1 x10^3/uL


(0.0-0.7) 


 





 


Basophils # (Auto)


 


 0.1 x10^3/uL


(0.0-0.2) 


 





 


Sodium Level


 


 138 mmol/L


(136-145) 


 





 


Potassium Level


 


 4.4 mmol/L


(3.5-5.1) 


 





 


Chloride Level


 


 99 mmol/L


() 


 





 


Carbon Dioxide Level


 


 33 mmol/L


(21-32) 


 





 


Anion Gap  6 (6-14)   


 


Blood Urea Nitrogen


 


 30 mg/dL


(8-26) 


 





 


Creatinine


 


 1.7 mg/dL


(0.7-1.3) 


 





 


Estimated GFR


(Cockcroft-Gault) 


 40.7 


 


 





 


BUN/Creatinine Ratio  18 (6-20)   


 


Glucose Level


 


 85 mg/dL


(70-99) 


 





 


Calcium Level


 


 9.6 mg/dL


(8.5-10.1) 


 





 


Magnesium Level


 


 1.8 mg/dL


(1.8-2.4) 


 





 


Total Bilirubin


 


 0.4 mg/dL


(0.2-1.0) 


 





 


Aspartate Amino Transf


(AST/SGOT) 


 28 U/L (15-37) 


 


 





 


Alanine Aminotransferase


(ALT/SGPT) 


 23 U/L (16-63) 


 


 





 


Alkaline Phosphatase


 


 122 U/L


() 


 





 


C-Reactive Protein,


Quantitative 


 20.2 mg/L


(0-3.3) 


 





 


Total Protein


 


 7.7 g/dL


(6.4-8.2) 


 





 


Albumin


 


 3.5 g/dL


(3.4-5.0) 


 





 


Albumin/Globulin Ratio  0.8 (1.0-1.7)   


 


Urine Collection Type    Unknown 


 


Urine Color    Yellow 


 


Urine Clarity    Clear 


 


Urine pH    5.0 


 


Urine Specific Gravity    1.020 


 


Urine Protein


 


 


 


 Negative mg/dL


(NEG-TRACE)


 


Urine Glucose (UA)


 


 


 


 Negative mg/dL


(NEG)


 


Urine Ketones (Stick)


 


 


 


 Negative mg/dL


(NEG)


 


Urine Blood    Negative (NEG) 


 


Urine Nitrite    Negative (NEG) 


 


Urine Bilirubin    Negative (NEG) 


 


Urine Urobilinogen Dipstick


 


 


 


 0.2 mg/dL (0.2


mg/dL)


 


Urine Leukocyte Esterase    Negative (NEG) 


 


Urine RBC


 


 


 


 6-10 /HPF


(0-2)


 


Urine WBC


 


 


 


 11-20 /HPF


(0-4)


 


Urine Squamous Epithelial


Cells 


 


 


 Mod /LPF 





 


Urine Bacteria


 


 


 


 Moderate /HPF


(0-FEW)


 


Urine Hyaline Casts    Many /HPF 


 


Urine Mucus    Marked /LPF 


 


Test


 10/2/19


22:49 10/3/19


07:09 


 





 


Glucose (Fingerstick)


 159 mg/dL


(70-99) 188 mg/dL


(70-99) 


 











Laboratory Tests








Test


 10/2/19


17:34 10/2/19


18:00 10/2/19


18:42 10/2/19


19:35


 


Glucose (Fingerstick)


 76 mg/dL


(70-99) 


 96 mg/dL


(70-99) 





 


White Blood Count


 


 11.3 x10^3/uL


(4.0-11.0) 


 





 


Red Blood Count


 


 4.76 x10^6/uL


(4.30-5.70) 


 





 


Hemoglobin


 


 12.1 g/dL


(13.0-17.5) 


 





 


Hematocrit


 


 37.7 %


(39.0-53.0) 


 





 


Mean Corpuscular Volume  79 fL ()   


 


Mean Corpuscular Hemoglobin  25 pg (25-35)   


 


Mean Corpuscular Hemoglobin


Concent 


 32 g/dL


(31-37) 


 





 


Red Cell Distribution Width


 


 19.0 %


(11.5-14.5) 


 





 


Platelet Count


 


 272 x10^3/uL


(140-400) 


 





 


Neutrophils (%) (Auto)  76 % (31-73)   


 


Lymphocytes (%) (Auto)  12 % (24-48)   


 


Monocytes (%) (Auto)  10 % (0-9)   


 


Eosinophils (%) (Auto)  1 % (0-3)   


 


Basophils (%) (Auto)  1 % (0-3)   


 


Neutrophils # (Auto)


 


 8.7 x10^3/uL


(1.8-7.7) 


 





 


Lymphocytes # (Auto)


 


 1.4 x10^3/uL


(1.0-4.8) 


 





 


Monocytes # (Auto)


 


 1.1 x10^3/uL


(0.0-1.1) 


 





 


Eosinophils # (Auto)


 


 0.1 x10^3/uL


(0.0-0.7) 


 





 


Basophils # (Auto)


 


 0.1 x10^3/uL


(0.0-0.2) 


 





 


Sodium Level


 


 138 mmol/L


(136-145) 


 





 


Potassium Level


 


 4.4 mmol/L


(3.5-5.1) 


 





 


Chloride Level


 


 99 mmol/L


() 


 





 


Carbon Dioxide Level


 


 33 mmol/L


(21-32) 


 





 


Anion Gap  6 (6-14)   


 


Blood Urea Nitrogen


 


 30 mg/dL


(8-26) 


 





 


Creatinine


 


 1.7 mg/dL


(0.7-1.3) 


 





 


Estimated GFR


(Cockcroft-Gault) 


 40.7 


 


 





 


BUN/Creatinine Ratio  18 (6-20)   


 


Glucose Level


 


 85 mg/dL


(70-99) 


 





 


Calcium Level


 


 9.6 mg/dL


(8.5-10.1) 


 





 


Magnesium Level


 


 1.8 mg/dL


(1.8-2.4) 


 





 


Total Bilirubin


 


 0.4 mg/dL


(0.2-1.0) 


 





 


Aspartate Amino Transf


(AST/SGOT) 


 28 U/L (15-37) 


 


 





 


Alanine Aminotransferase


(ALT/SGPT) 


 23 U/L (16-63) 


 


 





 


Alkaline Phosphatase


 


 122 U/L


() 


 





 


C-Reactive Protein,


Quantitative 


 20.2 mg/L


(0-3.3) 


 





 


Total Protein


 


 7.7 g/dL


(6.4-8.2) 


 





 


Albumin


 


 3.5 g/dL


(3.4-5.0) 


 





 


Albumin/Globulin Ratio  0.8 (1.0-1.7)   


 


Urine Collection Type    Unknown 


 


Urine Color    Yellow 


 


Urine Clarity    Clear 


 


Urine pH    5.0 


 


Urine Specific Gravity    1.020 


 


Urine Protein


 


 


 


 Negative mg/dL


(NEG-TRACE)


 


Urine Glucose (UA)


 


 


 


 Negative mg/dL


(NEG)


 


Urine Ketones (Stick)


 


 


 


 Negative mg/dL


(NEG)


 


Urine Blood    Negative (NEG) 


 


Urine Nitrite    Negative (NEG) 


 


Urine Bilirubin    Negative (NEG) 


 


Urine Urobilinogen Dipstick


 


 


 


 0.2 mg/dL (0.2


mg/dL)


 


Urine Leukocyte Esterase    Negative (NEG) 


 


Urine RBC


 


 


 


 6-10 /HPF


(0-2)


 


Urine WBC


 


 


 


 11-20 /HPF


(0-4)


 


Urine Squamous Epithelial


Cells 


 


 


 Mod /LPF 





 


Urine Bacteria


 


 


 


 Moderate /HPF


(0-FEW)


 


Urine Hyaline Casts    Many /HPF 


 


Urine Mucus    Marked /LPF 


 


Test


 10/2/19


22:49 10/3/19


07:09 


 





 


Glucose (Fingerstick)


 159 mg/dL


(70-99) 188 mg/dL


(70-99) 


 











Medications





Current Medications


Acetaminophen (Tylenol) 500 mg Q6HRS  PRN PO PAIN/FEVER;  Start 10/2/19 at 22:00


Aspirin (Children'S Aspirin) 81 mg DAILYWBKFT PO ;  Start 10/3/19 at 08:00


Vitamin D (Vitamin D3) 1,000 unit DAILY PO ;  Start 10/3/19 at 09:00


Citalopram Hydrobromide (CeleXA) 20 mg DAILY PO ;  Start 10/3/19 at 09:00


Clopidogrel Bisulfate (Plavix) 75 mg DAILY PO ;  Start 10/3/19 at 09:00


Diclofenac Sodium (Voltaren) 1 pablo BID TP ;  Start 10/3/19 at 09:00


Docusate Sodium (Colace) 100 mg BID PO ;  Start 10/3/19 at 09:00


Ergocalciferol (Vitamin D2) 50,000 unit QFR PO ;  Start 10/4/19 at 16:00


Lisinopril (Prinivil) 5 mg DAILY PO ;  Start 10/3/19 at 09:00


Metformin HCl (Glucophage) 850 mg BIDWMEALS PO ;  Start 10/3/19 at 08:00


Metoprolol Succinate (Toprol Xl) 25 mg DAILY PO ;  Start 10/3/19 at 09:00


Midodrine (Proamatine) 5 mg DAILY PO ;  Start 10/3/19 at 09:00


Nitroglycerin (Nitrostat) 0.4 mg PRN Q5MIN  PRN SL CHEST PAIN;  Start 10/2/19 at

22:00


Oxycodone/ Acetaminophen (Percocet 10/325) 1 tab PRN Q8HRS  PRN PO PAIN Last 

administered on 10/2/19at 23:03;  Start 10/2/19 at 22:00


Atorvastatin Calcium (Lipitor) 80 mg HS PO  Last administered on 10/2/19at 

23:02;  Start 10/2/19 at 22:45


Bupropion HCl (Wellbutrin Sr) 300 mg DAILY PO ;  Start 10/3/19 at 09:00


Fluoxetine HCl (PROzac) 40 mg DAILY PO ;  Start 10/3/19 at 09:00


Non-Formulary Medication (Tramadol Hcl/ Acetaminophen (Tramadol-Acetaminophn 

37.5-325)) mild pain QHS PO ;  Start 10/3/19 at 21:00;  Status UNV


Trazodone HCl (Desyrel) 150 mg PRN QHS  PRN PO INSOMNIA Last administered on 

10/2/19at 23:02;  Start 10/2/19 at 22:00


Triamcinolone Acetonide (Kenalog 0.1%) 1 pablo BID TP ;  Start 10/3/19 at 09:00


Acetaminophen (Tylenol) 1,000 mg Q6HRS  PRN PO PAIN/FEVER;  Start 10/2/19 at 

22:45


Tramadol HCl (Ultram) 37.5 mg QHS PO ;  Start 10/3/19 at 21:00


Acetaminophen (Tylenol) 325 mg QHS PO ;  Start 10/3/19 at 21:00


Tramadol HCl (Ultram) 37.5 mg PRN QHS  PRN PO PAIN, IF 2ND DOSE IS NEEDED;  

Start 10/3/19 at 21:00


Acetaminophen (Tylenol) 325 mg PRN QHS  PRN PO PAIN,IF 2ND DOSE IS NEEDED;  

Start 10/3/19 at 21:00





Active Scripts


Active


Reported


Humulin R U-500 Kwikpen (Insulin Regular, Human) 500 Unit/1 Ml Insuln.pen 90 

Unit SQ DAILYWSUP


Humulin R U-500 Kwikpen (Insulin Regular, Human) 500 Unit/1 Ml Insuln.pen 90 

Unit SQ DAILYWLUN


Humulin R U-500 Kwikpen (Insulin Regular, Human) 500 Unit/1 Ml Insuln.pen 110 

Units SQ DAILYWBKFT


Midodrine Hcl 5 Mg Tablet 5 Mg PO DAILY


Citalopram Hbr (Citalopram Hydrobromide) 20 Mg Tablet 1 Tab PO DAILY


Levemir (Insulin Detemir) 100 Unit/1 Ml Vial 100 Unit SQ BID


Vitamin D3 (Cholecalciferol (Vitamin D3)) 1,000 Unit Tablet 1,000 Unit PO DAILY


Vitamin B12 (Cyanocobalamin (Vitamin B-12)) 2,500 Mcg Tablet 1,000 Mcg PO 


Triamcinolone Acetonide 0.5% Oint (Triamcinolone Acetonide) 15 Gm Oint...g. 1 A

pp TP BID


Tramadol-Acetaminophn 37.5-325 (Tramadol Hcl/Acetaminophen) 1 Each Tablet 1-2 

Tab PO QHS


Percocet  Mg Tablet ** (Oxycodone/Acetaminophen) 1 Each Tablet 1 Tab PO 

PRN Q8HRS


Novolog Flexpen (Insulin Aspart) 100 Unit/1 Ml Insuln.pen 15 Unit SQ PRN


Novolog Flexpen (Insulin Aspart) 100 Unit/1 Ml Insuln.pen 30 Unit SQ TIDWMEALS


NITROGLYCERIN SubLingual (Nitroglycerin) 0.4 Mg Tab.subl 0.4 Mg SL PRN Q5MIN PRN


Fluoxetine Hcl 40 Mg Capsule 40 Mg PO DAILY


Vitamin D2 (Ergocalciferol (Vitamin D2)) 50,000 Unit Capsule 50,000 Unit PO QFR


Docusate Sodium 100 Mg Capsule 100 Mg PO BID


Voltaren (Diclofenac Sodium) 100 Gm Gel..gram. 1 Gm TP BID


Atorvastatin Calcium 80 Mg Tablet 80 Mg PO HS


Clopidogrel (Clopidogrel Bisulfate) 75 Mg Tablet 75 Mg PO DAILY


Acetaminophen 500 Mg Tablet 1-2 Tab PO Q6HRS


Lisinopril 5 Mg Tablet 1 Tab PO DAILY


Aspirin 81 Mg Tab.chew 1 Tab PO DAILY


Metoprolol Succinate ( Xl ) (Metoprolol Succinate) 25 Mg Tab.er.24h 1 Tab PO 

DAILY


Trazodone Hcl 150 Mg Tablet 1 Tab PO QHS PRN


Bupropion Hcl Sr (Bupropion Hcl) 150 Mg Tablet.er 300 Mg PO DAILY


Metformin Hcl 850 Mg Tablet 850 Mg PO BID


     hold med until 10/5/16


Vitals/I & O





Vital Sign - Last 24 Hours








 10/2/19 10/2/19 10/2/19 10/2/19





 17:30 18:21 19:05 20:05


 


Temp 98.3   





 98.3   


 


Pulse 89 94 94 90


 


Resp 20 18 16 16


 


B/P (MAP) 107/56 (73) 130/57 (81) 138/61 (86) 112/57 (75)


 


Pulse Ox 94 96 97 96


 


O2 Delivery Room Air Room Air  Room Air


 


    





    





 10/2/19 10/2/19 10/2/19 10/2/19





 21:15 22:56 23:03 23:40


 


Temp 98.6 98.6  





 98.6 98.6  


 


Pulse 91 96  


 


Resp 18 18  


 


B/P (MAP) 118/43 (68) 112/40 (64)  


 


Pulse Ox 97 96  98


 


O2 Delivery Room Air  Room Air BiPAP/CPAP


 


    





    





 10/3/19 10/3/19 10/3/19 10/3/19





 02:40 02:45 03:00 05:50


 


Resp   16 


 


B/P (MAP)   98/41 (60) 


 


Pulse Ox   98 


 


O2 Delivery Room Air BiPAP/CPAP  BiPAP/CPAP





 10/3/19   





 07:15   


 


Temp 98.1   





 98.1   


 


Pulse 85   


 


Resp 22   


 


B/P (MAP) 136/52 (80)   


 


Pulse Ox 95   


 


O2 Delivery Room Air   














Intake and Output   


 


 10/2/19 10/2/19 10/3/19





 15:00 23:00 07:00


 


Intake Total  0 ml 700 ml


 


Balance  0 ml 700 ml

















DANIELA TADEO MD         Oct 3, 2019 08:52

## 2019-10-04 VITALS — SYSTOLIC BLOOD PRESSURE: 129 MMHG | DIASTOLIC BLOOD PRESSURE: 65 MMHG

## 2019-10-04 VITALS — DIASTOLIC BLOOD PRESSURE: 47 MMHG | SYSTOLIC BLOOD PRESSURE: 137 MMHG

## 2019-10-04 VITALS — DIASTOLIC BLOOD PRESSURE: 37 MMHG | SYSTOLIC BLOOD PRESSURE: 117 MMHG

## 2019-10-04 VITALS — SYSTOLIC BLOOD PRESSURE: 144 MMHG | DIASTOLIC BLOOD PRESSURE: 52 MMHG

## 2019-10-04 VITALS — DIASTOLIC BLOOD PRESSURE: 45 MMHG | SYSTOLIC BLOOD PRESSURE: 140 MMHG

## 2019-10-04 VITALS — DIASTOLIC BLOOD PRESSURE: 61 MMHG | SYSTOLIC BLOOD PRESSURE: 123 MMHG

## 2019-10-04 LAB
ANION GAP SERPL CALC-SCNC: 4 MMOL/L (ref 6–14)
BASOPHILS # BLD AUTO: 0 X10^3/UL (ref 0–0.2)
BASOPHILS NFR BLD: 0 % (ref 0–3)
BUN SERPL-MCNC: 27 MG/DL (ref 8–26)
CALCIUM SERPL-MCNC: 8.9 MG/DL (ref 8.5–10.1)
CHLORIDE SERPL-SCNC: 98 MMOL/L (ref 98–107)
CO2 SERPL-SCNC: 33 MMOL/L (ref 21–32)
CREAT SERPL-MCNC: 1.5 MG/DL (ref 0.7–1.3)
EOSINOPHIL NFR BLD: 0.1 X10^3/UL (ref 0–0.7)
EOSINOPHIL NFR BLD: 1 % (ref 0–3)
ERYTHROCYTE [DISTWIDTH] IN BLOOD BY AUTOMATED COUNT: 18.5 % (ref 11.5–14.5)
GFR SERPLBLD BASED ON 1.73 SQ M-ARVRAT: 47 ML/MIN
GLUCOSE SERPL-MCNC: 286 MG/DL (ref 70–99)
HCT VFR BLD CALC: 34.5 % (ref 39–53)
HGB BLD-MCNC: 11.1 G/DL (ref 13–17.5)
LYMPHOCYTES # BLD: 1.2 X10^3/UL (ref 1–4.8)
LYMPHOCYTES NFR BLD AUTO: 16 % (ref 24–48)
MCH RBC QN AUTO: 26 PG (ref 25–35)
MCHC RBC AUTO-ENTMCNC: 32 G/DL (ref 31–37)
MCV RBC AUTO: 80 FL (ref 79–100)
MONO #: 0.8 X10^3/UL (ref 0–1.1)
MONOCYTES NFR BLD: 10 % (ref 0–9)
NEUT #: 5.4 X10^3/UL (ref 1.8–7.7)
NEUTROPHILS NFR BLD AUTO: 72 % (ref 31–73)
PLATELET # BLD AUTO: 233 X10^3/UL (ref 140–400)
POTASSIUM SERPL-SCNC: 5 MMOL/L (ref 3.5–5.1)
RBC # BLD AUTO: 4.31 X10^6/UL (ref 4.3–5.7)
SODIUM SERPL-SCNC: 135 MMOL/L (ref 136–145)
WBC # BLD AUTO: 7.5 X10^3/UL (ref 4–11)

## 2019-10-04 RX ADMIN — INSULIN GLARGINE SCH UNIT: 100 INJECTION, SOLUTION SUBCUTANEOUS at 09:11

## 2019-10-04 RX ADMIN — INSULIN GLARGINE SCH UNIT: 100 INJECTION, SOLUTION SUBCUTANEOUS at 21:05

## 2019-10-04 RX ADMIN — ATORVASTATIN CALCIUM SCH MG: 40 TABLET, FILM COATED ORAL at 20:54

## 2019-10-04 RX ADMIN — INSULIN LISPRO SCH UNITS: 100 INJECTION, SOLUTION INTRAVENOUS; SUBCUTANEOUS at 12:16

## 2019-10-04 RX ADMIN — FUROSEMIDE SCH MG: 80 TABLET ORAL at 09:05

## 2019-10-04 RX ADMIN — CITALOPRAM HYDROBROMIDE SCH MG: 20 TABLET ORAL at 09:05

## 2019-10-04 RX ADMIN — VITAMIN D, TAB 1000IU (100/BT) SCH UNIT: 25 TAB at 09:05

## 2019-10-04 RX ADMIN — INSULIN LISPRO SCH UNITS: 100 INJECTION, SOLUTION INTRAVENOUS; SUBCUTANEOUS at 09:12

## 2019-10-04 RX ADMIN — BUPROPION HYDROCHLORIDE SCH MG: 150 TABLET, FILM COATED, EXTENDED RELEASE ORAL at 09:04

## 2019-10-04 RX ADMIN — LISINOPRIL SCH MG: 5 TABLET ORAL at 09:05

## 2019-10-04 RX ADMIN — DOCUSATE SODIUM SCH MG: 100 CAPSULE, LIQUID FILLED ORAL at 09:05

## 2019-10-04 RX ADMIN — ACETAMINOPHEN SCH MG: 325 TABLET, FILM COATED ORAL at 20:54

## 2019-10-04 RX ADMIN — DOCUSATE SODIUM SCH MG: 100 CAPSULE, LIQUID FILLED ORAL at 20:55

## 2019-10-04 RX ADMIN — BUPROPION HYDROCHLORIDE SCH MG: 150 TABLET, FILM COATED, EXTENDED RELEASE ORAL at 20:54

## 2019-10-04 RX ADMIN — INSULIN LISPRO SCH UNITS: 100 INJECTION, SOLUTION INTRAVENOUS; SUBCUTANEOUS at 16:30

## 2019-10-04 RX ADMIN — OXYCODONE HYDROCHLORIDE AND ACETAMINOPHEN PRN TAB: 10; 325 TABLET ORAL at 09:04

## 2019-10-04 NOTE — NUR
SW following pt. PT/OT recommends SNU. Spoke with pt's wife about options, insurance 
coverage and medicare ratings. Pt's wife Chose [facility]. YESSENIA phoned and faxed 
referral to [facility]. Pt acceptance and admission pending. Will continue to follow. Discussed with 
RN.

## 2019-10-04 NOTE — PDOC
PROGRESS NOTES


Chief Complaint


Chief Complaint


A/P:


Achilles tendon injury - he felt something snap in his right heel and has pain 

and weakness on dorsiflexion. No quinolone exposure recently that he knows of


Unable to walk - 2/2 cast on LLE and right leg weakness


Hypertension - cont meds


Hyperlipidemia - statin


Symptomatic hypoglycemia - his recent U-500 dosing may play a role, will hold 

this.


Obesity - morbid obesity, counseled on weight loss, consider GLP-1 or SGLT-2


H/o Achilles tendon rupture on the left - in cast currently


CAD - s/p 4x Coronary bypass surgery 12/2015


PPM in situ - dual chamber, [facility] last download 11/15/2018 normal device 

function, AFIB burden <1%, V paced 4.5%. No significant arrhythmia. 


S/p TAVR - 11/2017


Peripheral vascular disease - noted with medical management of bilateral lower 

extremity disease on non-occlusive studies in 2016


Diabetes - seeing [employer], was recently started on U-500, has had 

daily hypoglycemic episodes into the 30s and 40s. Will change him back to a 

lantus BID and lispro TID schedule with sliding scale while in house. Consider 

SGLT-2 or GLP-1





Plan:


I would anticipate he will need placement. He can follow up with Orthopedic 

surgery, Dr. Daigle, in 2-3 weeks. non-operative likely partial tear of right 

achilles tendon, can be placed in CAM boot.





History of Present Illness


History of Present Illness


Mr Mercedes is a 64-year-old male w/ PMHx PPM in situ, s/p TAVR, hypertension, 

hyperlipidemia, obesity, previous Achilles tendon rupture on the left, CAD s/p 

coronary bypass surgery, peripheral vascular disease, diabetes who has been 

recovering in a cast from his left achilles rupture who got up and fell and he 

likely partially ruptured his right Achilles tendon. He is unable to walk and 

will be admitted for further treatment and diagnosis.





Seen by ortho, has plans for MRI, will need his pacer deactivated for this. His 

right heel is extremely painful today. He had 1 low blood glucose yesterday, 

insulin adjusted (now he is at 20% of what his outpatient dosing was reported to

be). Denies CP or SOB.





Vitals


Vitals





Vital Signs








  Date Time  Temp Pulse Resp B/P (MAP) Pulse Ox O2 Delivery O2 Flow Rate FiO2


 


10/4/19 07:00 98.1 97 18 140/45 (76) 95 Room Air  





 98.1       











Physical Exam


General:  Alert, Oriented X3


Heart:  Regular rate


Lungs:  Clear, Other


Abdomen:  Soft, No tenderness


Extremities:  No edema, Other (her cells pedis 1+ and the right)


Skin:  Other (he has scattered superficial ulcers at his right calf)





Labs


LABS





Laboratory Tests








Test


 10/3/19


11:36 10/3/19


16:37 10/3/19


18:17 10/3/19


21:36


 


Glucose (Fingerstick)


 283 mg/dL


(70-99) 68 mg/dL


(70-99) 94 mg/dL


(70-99) 143 mg/dL


(70-99)


 


Test


 10/4/19


08:09 


 


 





 


Glucose (Fingerstick)


 170 mg/dL


(70-99) 


 


 














Assessment and Plan


Assessmemt and Plan


Problems


Medical Problems:


(1) Hypoglycemia


Status: Acute  





(2) Unable to walk


Status: Acute  











Comment


Review of Relevant


I have reviewed the following items jarrod (where applicable) has been applied.


Labs





Laboratory Tests








Test


 10/2/19


17:34 10/2/19


18:00 10/2/19


18:42 10/2/19


19:35


 


Glucose (Fingerstick)


 76 mg/dL


(70-99) 


 96 mg/dL


(70-99) 





 


White Blood Count


 


 11.3 x10^3/uL


(4.0-11.0) 


 





 


Red Blood Count


 


 4.76 x10^6/uL


(4.30-5.70) 


 





 


Hemoglobin


 


 12.1 g/dL


(13.0-17.5) 


 





 


Hematocrit


 


 37.7 %


(39.0-53.0) 


 





 


Mean Corpuscular Volume  79 fL ()   


 


Mean Corpuscular Hemoglobin  25 pg (25-35)   


 


Mean Corpuscular Hemoglobin


Concent 


 32 g/dL


(31-37) 


 





 


Red Cell Distribution Width


 


 19.0 %


(11.5-14.5) 


 





 


Platelet Count


 


 272 x10^3/uL


(140-400) 


 





 


Neutrophils (%) (Auto)  76 % (31-73)   


 


Lymphocytes (%) (Auto)  12 % (24-48)   


 


Monocytes (%) (Auto)  10 % (0-9)   


 


Eosinophils (%) (Auto)  1 % (0-3)   


 


Basophils (%) (Auto)  1 % (0-3)   


 


Neutrophils # (Auto)


 


 8.7 x10^3/uL


(1.8-7.7) 


 





 


Lymphocytes # (Auto)


 


 1.4 x10^3/uL


(1.0-4.8) 


 





 


Monocytes # (Auto)


 


 1.1 x10^3/uL


(0.0-1.1) 


 





 


Eosinophils # (Auto)


 


 0.1 x10^3/uL


(0.0-0.7) 


 





 


Basophils # (Auto)


 


 0.1 x10^3/uL


(0.0-0.2) 


 





 


Sodium Level


 


 138 mmol/L


(136-145) 


 





 


Potassium Level


 


 4.4 mmol/L


(3.5-5.1) 


 





 


Chloride Level


 


 99 mmol/L


() 


 





 


Carbon Dioxide Level


 


 33 mmol/L


(21-32) 


 





 


Anion Gap  6 (6-14)   


 


Blood Urea Nitrogen


 


 30 mg/dL


(8-26) 


 





 


Creatinine


 


 1.7 mg/dL


(0.7-1.3) 


 





 


Estimated GFR


(Cockcroft-Gault) 


 40.7 


 


 





 


BUN/Creatinine Ratio  18 (6-20)   


 


Glucose Level


 


 85 mg/dL


(70-99) 


 





 


Calcium Level


 


 9.6 mg/dL


(8.5-10.1) 


 





 


Magnesium Level


 


 1.8 mg/dL


(1.8-2.4) 


 





 


Total Bilirubin


 


 0.4 mg/dL


(0.2-1.0) 


 





 


Aspartate Amino Transf


(AST/SGOT) 


 28 U/L (15-37) 


 


 





 


Alanine Aminotransferase


(ALT/SGPT) 


 23 U/L (16-63) 


 


 





 


Alkaline Phosphatase


 


 122 U/L


() 


 





 


C-Reactive Protein,


Quantitative 


 20.2 mg/L


(0-3.3) 


 





 


Total Protein


 


 7.7 g/dL


(6.4-8.2) 


 





 


Albumin


 


 3.5 g/dL


(3.4-5.0) 


 





 


Albumin/Globulin Ratio  0.8 (1.0-1.7)   


 


Urine Collection Type    Unknown 


 


Urine Color    Yellow 


 


Urine Clarity    Clear 


 


Urine pH    5.0 


 


Urine Specific Gravity    1.020 


 


Urine Protein


 


 


 


 Negative mg/dL


(NEG-TRACE)


 


Urine Glucose (UA)


 


 


 


 Negative mg/dL


(NEG)


 


Urine Ketones (Stick)


 


 


 


 Negative mg/dL


(NEG)


 


Urine Blood    Negative (NEG) 


 


Urine Nitrite    Negative (NEG) 


 


Urine Bilirubin    Negative (NEG) 


 


Urine Urobilinogen Dipstick


 


 


 


 0.2 mg/dL (0.2


mg/dL)


 


Urine Leukocyte Esterase    Negative (NEG) 


 


Urine RBC


 


 


 


 6-10 /HPF


(0-2)


 


Urine WBC


 


 


 


 11-20 /HPF


(0-4)


 


Urine Squamous Epithelial


Cells 


 


 


 Mod /LPF 





 


Urine Bacteria


 


 


 


 Moderate /HPF


(0-FEW)


 


Urine Hyaline Casts    Many /HPF 


 


Urine Mucus    Marked /LPF 


 


Test


 10/2/19


22:49 10/3/19


07:09 10/3/19


11:36 10/3/19


16:37


 


Glucose (Fingerstick)


 159 mg/dL


(70-99) 188 mg/dL


(70-99) 283 mg/dL


(70-99) 68 mg/dL


(70-99)


 


Test


 10/3/19


18:17 10/3/19


21:36 10/4/19


08:09 





 


Glucose (Fingerstick)


 94 mg/dL


(70-99) 143 mg/dL


(70-99) 170 mg/dL


(70-99) 











Laboratory Tests








Test


 10/3/19


11:36 10/3/19


16:37 10/3/19


18:17 10/3/19


21:36


 


Glucose (Fingerstick)


 283 mg/dL


(70-99) 68 mg/dL


(70-99) 94 mg/dL


(70-99) 143 mg/dL


(70-99)


 


Test


 10/4/19


08:09 


 


 





 


Glucose (Fingerstick)


 170 mg/dL


(70-99) 


 


 











Medications





Current Medications


Acetaminophen (Tylenol) 500 mg Q6HRS  PRN PO PAIN/FEVER;  Start 10/2/19 at 22:00

;  Stop 10/3/19 at 12:48;  Status DC


Aspirin (Children'S Aspirin) 81 mg DAILYWBKFT PO ;  Start 10/3/19 at 08:00;  

Stop 10/3/19 at 09:35;  Status DC


Vitamin D (Vitamin D3) 1,000 unit DAILY PO  Last administered on 10/3/19at 

10:07;  Start 10/3/19 at 09:00


Citalopram Hydrobromide (CeleXA) 20 mg DAILY PO  Last administered on 10/3/19at 

10:07;  Start 10/3/19 at 09:00


Clopidogrel Bisulfate (Plavix) 75 mg DAILY PO ;  Start 10/3/19 at 09:00;  Stop 

10/3/19 at 09:35;  Status DC


Diclofenac Sodium (Voltaren) 1 pablo BID TP ;  Start 10/3/19 at 09:00;  Stop 

10/3/19 at 09:35;  Status DC


Docusate Sodium (Colace) 100 mg BID PO  Last administered on 10/3/19at 20:37;  

Start 10/3/19 at 09:00


Ergocalciferol (Vitamin D2) 50,000 unit QFR PO ;  Start 10/4/19 at 16:00


Lisinopril (Prinivil) 5 mg DAILY PO  Last administered on 10/3/19at 10:07;  

Start 10/3/19 at 09:00


Metformin HCl (Glucophage) 850 mg BIDWMEALS PO ;  Start 10/3/19 at 08:00;  Stop 

10/3/19 at 09:35;  Status DC


Metoprolol Succinate (Toprol Xl) 25 mg DAILY PO ;  Start 10/3/19 at 09:00;  Stop

10/3/19 at 09:35;  Status DC


Midodrine (Proamatine) 5 mg DAILY PO ;  Start 10/3/19 at 09:00;  Stop 10/3/19 at

09:35;  Status DC


Nitroglycerin (Nitrostat) 0.4 mg PRN Q5MIN  PRN SL CHEST PAIN;  Start 10/2/19 at

22:00


Oxycodone/ Acetaminophen (Percocet 10/325) 1 tab PRN Q8HRS  PRN PO PAIN Last 

administered on 10/3/19at 23:31;  Start 10/2/19 at 22:00


Atorvastatin Calcium (Lipitor) 80 mg HS PO  Last administered on 10/3/19at 

20:37;  Start 10/2/19 at 22:45


Bupropion HCl (Wellbutrin Sr) 300 mg DAILY PO ;  Start 10/3/19 at 09:00;  Stop 

10/3/19 at 09:35;  Status DC


Fluoxetine HCl (PROzac) 40 mg DAILY PO ;  Start 10/3/19 at 09:00;  Stop 10/3/19 

at 09:35;  Status DC


Non-Formulary Medication (Tramadol Hcl/ Acetaminophen (Tramadol-Acetaminophn 

37.5-325)) mild pain QHS PO ;  Start 10/3/19 at 21:00;  Status UNV


Trazodone HCl (Desyrel) 150 mg PRN QHS  PRN PO INSOMNIA Last administered on 

10/3/19at 23:30;  Start 10/2/19 at 22:00


Triamcinolone Acetonide (Kenalog 0.1%) 1 pablo BID TP ;  Start 10/3/19 at 09:00;  

Stop 10/3/19 at 09:35;  Status DC


Acetaminophen (Tylenol) 1,000 mg Q6HRS  PRN PO PAIN/FEVER;  Start 10/2/19 at 

22:45;  Stop 10/3/19 at 12:49;  Status DC


Tramadol HCl (Ultram) 37.5 mg QHS PO  Last administered on 10/3/19at 20:37;  

Start 10/3/19 at 21:00


Acetaminophen (Tylenol) 325 mg QHS PO  Last administered on 10/3/19at 20:36;  

Start 10/3/19 at 21:00


Tramadol HCl (Ultram) 37.5 mg PRN QHS  PRN PO PAIN, IF 2ND DOSE IS NEEDED;  

Start 10/3/19 at 21:00


Acetaminophen (Tylenol) 325 mg PRN QHS  PRN PO PAIN,IF 2ND DOSE IS NEEDED;  

Start 10/3/19 at 21:00


Bupropion HCl (Wellbutrin Sr) 150 mg BID PO  Last administered on 10/3/19at 

20:37;  Start 10/3/19 at 10:00


Metformin HCl (Glucophage) 850 mg TIDWMEALS PO  Last administered on 10/3/19at 

17:18;  Start 10/3/19 at 09:30


Furosemide (Lasix) 80 mg DAILY PO  Last administered on 10/3/19at 10:06;  Start 

10/3/19 at 10:00


Insulin Glargine (Lantus Syringe) 50 unit BID SQ  Last administered on 10/3/19at

12:39;  Start 10/3/19 at 10:00;  Stop 10/3/19 at 18:37;  Status DC


Insulin Human Lispro (HumaLOG) 30 units TIDAC SQ  Last administered on 10/3/19at

12:39;  Start 10/3/19 at 09:30;  Stop 10/3/19 at 18:37;  Status DC


Insulin Human Lispro (HumaLOG) 15 units PRN TID  PRN SQ WITH SNACKS;  Start 

10/3/19 at 09:30


Acetaminophen (Tylenol) 500 mg PRN Q6HRS  PRN PO PAIN/FEVER;  Start 10/3/19 at 

13:00


Acetaminophen (Tylenol) 1,000 mg PRN Q6HRS  PRN PO PAIN/FEVER;  Start 10/3/19 at

13:00


Insulin Glargine (Lantus Syringe) 40 unit BID SQ  Last administered on 10/3/19at

20:45;  Start 10/3/19 at 21:00


Insulin Human Lispro (HumaLOG) 25 units TIDAC SQ ;  Start 10/4/19 at 07:30


Sodium Chloride 500 ml @  500 mls/hr 1X  ONCE IV  Last administered on 10/3/19at

20:24;  Start 10/3/19 at 18:45;  Stop 10/3/19 at 19:44;  Status DC





Active Scripts


Active


Reported


Lasix (Furosemide) 80 Mg Tablet 1 Tab PO DAILY


Humulin R U-500 Kwikpen (Insulin Regular, Human) 500 Unit/1 Ml Insuln.pen 90 

Unit SQ DAILYWSUP


Humulin R U-500 Kwikpen (Insulin Regular, Human) 500 Unit/1 Ml Insuln.pen 90 Uni

t SQ DAILYWLUN


Humulin R U-500 Kwikpen (Insulin Regular, Human) 500 Unit/1 Ml Insuln.pen 110 

Units SQ DAILYWBKFT


Citalopram Hbr (Citalopram Hydrobromide) 20 Mg Tablet 1 Tab PO DAILY


Levemir (Insulin Detemir) 100 Unit/1 Ml Vial 50 Unit SQ BID


Percocet  Mg Tablet ** (Oxycodone/Acetaminophen) 1 Each Tablet 1 Tab PO 

PRN Q8HRS


Novolog Flexpen (Insulin Aspart) 100 Unit/1 Ml Insuln.pen 15 Unit SQ PRN


Novolog Flexpen (Insulin Aspart) 100 Unit/1 Ml Insuln.pen 30 Unit SQ TIDWMEALS


NITROGLYCERIN SubLingual (Nitroglycerin) 0.4 Mg Tab.subl 0.4 Mg SL PRN Q5MIN PRN


Fluoxetine Hcl 40 Mg Capsule 40 Mg PO DAILY


Docusate Sodium 100 Mg Capsule 100 Mg PO BID


Atorvastatin Calcium 80 Mg Tablet 80 Mg PO HS


Acetaminophen 500 Mg Tablet 1-2 Tab PO Q6HRS


Lisinopril 5 Mg Tablet 1 Tab PO DAILY


Trazodone Hcl 150 Mg Tablet 1 Tab PO QHS PRN


Bupropion Hcl Sr (Bupropion Hcl) 150 Mg Tablet.er 150 Mg PO BID


Metformin Hcl 850 Mg Tablet 850 Mg PO TID


     hold med until 10/5/16


Vitals/I & O





Vital Sign - Last 24 Hours








 10/3/19 10/3/19 10/3/19 10/3/19





 10:07 10:13 11:09 15:12


 


Temp   98.5 98.4





   98.5 98.4


 


Pulse 85  87 85


 


Resp   20 18


 


B/P (MAP) 136/52  137/44 (75) 117/43 (67)


 


Pulse Ox  95 95 94


 


O2 Delivery  Room Air Room Air Room Air


 


    





    





 10/3/19 10/3/19 10/3/19 10/3/19





 19:00 20:00 23:00 23:23


 


Temp 99.2  97.8 





 99.2  97.8 


 


Pulse 85  87 


 


Resp 20  18 


 


B/P (MAP) 103/66 (78)  114/57 (76) 


 


Pulse Ox 93  91 


 


O2 Delivery  Room Air  BiPAP/CPAP


 


    





    





 10/4/19 10/4/19  





 03:00 07:00  


 


Temp 97.5 98.1  





 97.5 98.1  


 


Pulse 92 97  


 


Resp 20 18  


 


B/P (MAP) 144/52 (82) 140/45 (76)  


 


Pulse Ox 100 95  


 


O2 Delivery  Room Air  














Intake and Output   


 


 10/3/19 10/3/19 10/4/19





 14:59 22:59 06:59


 


Intake Total 1300 ml 750 ml 


 


Output Total   1100 ml


 


Balance 1300 ml 750 ml -1100 ml

















DANIELA TADEO MD         Oct 4, 2019 09:06

## 2019-10-04 NOTE — PDOC2
CONSULT


Date of Consult


Date of Consult


DATE: 10/4/19 


TIME: 08:45





Reason for Consult


Reason for Consult:


Bilateral Achilles ruptures





Referring Physician


Referring Physician:


Becky





Identification/Chief Complaint


Chief Complaint


Bilateral ankle pain





Source


Source:  Chart review, Patient





History of Present Illness


Reason for Visit:


Patient tells me that about a week and a half ago he felt acute onset of pain 

and difficulty plantar flexing his left ankle he was seen by an outside 

orthopedic clinic and placed into a cast. After that, he was walking up the 

stairs and felt same thing happen on the right side. He was placed into a cam 

boot for this. He is currently complaining of bilateral ankle pain, tolerable at

rest but worse with any attempted movement or ambulation. He was admitted for 

inability to ambulate and get into his house, negotiating stairs. He has had 

chronic wounds on his lower extremities in the past as well.





Past Medical History


Cardiovascular:  CAD, HTN, Syncope, Hyperlipidemia, Aortic stenosis, Other


Pulmonary:  COPD, Other


CENTRAL NERVOUS SYSTEM:  Periperal neuropathy


GI:  GERD


Hepatobiliary:  Cholelithiasis


Psych:  Depression


Musculoskeletal:   low back pain, Osteoarthritis


Rheumatologic:  Fibromyalgia


Endocrine:  Diabetes





Past Surgical History


Past Surgical History:  Pacemaker, Appendectomy, Arthroscopy, Cholecystectomy, 

CABG, Other





Family History


Family History:  Heart Disease





Social History


ALCOHOL:  none


Drugs:  None


Lives:  Homeless





Current Problem List


Problem List


Problems


Medical Problems:


(1) Hypoglycemia


Status: Acute  





(2) Unable to walk


Status: Acute  











Current Medications


Current Medications





Current Medications


Acetaminophen (Tylenol) 500 mg Q6HRS  PRN PO PAIN/FEVER;  Start 10/2/19 at 

22:00;  Stop 10/3/19 at 12:48;  Status DC


Aspirin (Children'S Aspirin) 81 mg DAILYWBKFT PO ;  Start 10/3/19 at 08:00;  

Stop 10/3/19 at 09:35;  Status DC


Vitamin D (Vitamin D3) 1,000 unit DAILY PO  Last administered on 10/3/19at 

10:07;  Start 10/3/19 at 09:00


Citalopram Hydrobromide (CeleXA) 20 mg DAILY PO  Last administered on 10/3/19at 

10:07;  Start 10/3/19 at 09:00


Clopidogrel Bisulfate (Plavix) 75 mg DAILY PO ;  Start 10/3/19 at 09:00;  Stop 

10/3/19 at 09:35;  Status DC


Diclofenac Sodium (Voltaren) 1 pablo BID TP ;  Start 10/3/19 at 09:00;  Stop 

10/3/19 at 09:35;  Status DC


Docusate Sodium (Colace) 100 mg BID PO  Last administered on 10/3/19at 20:37;  

Start 10/3/19 at 09:00


Ergocalciferol (Vitamin D2) 50,000 unit QFR PO ;  Start 10/4/19 at 16:00


Lisinopril (Prinivil) 5 mg DAILY PO  Last administered on 10/3/19at 10:07;  St

art 10/3/19 at 09:00


Metformin HCl (Glucophage) 850 mg BIDWMEALS PO ;  Start 10/3/19 at 08:00;  Stop 

10/3/19 at 09:35;  Status DC


Metoprolol Succinate (Toprol Xl) 25 mg DAILY PO ;  Start 10/3/19 at 09:00;  Stop

10/3/19 at 09:35;  Status DC


Midodrine (Proamatine) 5 mg DAILY PO ;  Start 10/3/19 at 09:00;  Stop 10/3/19 at

09:35;  Status DC


Nitroglycerin (Nitrostat) 0.4 mg PRN Q5MIN  PRN SL CHEST PAIN;  Start 10/2/19 at

22:00


Oxycodone/ Acetaminophen (Percocet 10/325) 1 tab PRN Q8HRS  PRN PO PAIN Last 

administered on 10/3/19at 23:31;  Start 10/2/19 at 22:00


Atorvastatin Calcium (Lipitor) 80 mg HS PO  Last administered on 10/3/19at 20:37

;  Start 10/2/19 at 22:45


Bupropion HCl (Wellbutrin Sr) 300 mg DAILY PO ;  Start 10/3/19 at 09:00;  Stop 

10/3/19 at 09:35;  Status DC


Fluoxetine HCl (PROzac) 40 mg DAILY PO ;  Start 10/3/19 at 09:00;  Stop 10/3/19 

at 09:35;  Status DC


Non-Formulary Medication (Tramadol Hcl/ Acetaminophen (Tramadol-Acetaminophn 

37.5-325)) mild pain QHS PO ;  Start 10/3/19 at 21:00;  Status UNV


Trazodone HCl (Desyrel) 150 mg PRN QHS  PRN PO INSOMNIA Last administered on 

10/3/19at 23:30;  Start 10/2/19 at 22:00


Triamcinolone Acetonide (Kenalog 0.1%) 1 pablo BID TP ;  Start 10/3/19 at 09:00;  

Stop 10/3/19 at 09:35;  Status DC


Acetaminophen (Tylenol) 1,000 mg Q6HRS  PRN PO PAIN/FEVER;  Start 10/2/19 at 

22:45;  Stop 10/3/19 at 12:49;  Status DC


Tramadol HCl (Ultram) 37.5 mg QHS PO  Last administered on 10/3/19at 20:37;  

Start 10/3/19 at 21:00


Acetaminophen (Tylenol) 325 mg QHS PO  Last administered on 10/3/19at 20:36;  

Start 10/3/19 at 21:00


Tramadol HCl (Ultram) 37.5 mg PRN QHS  PRN PO PAIN, IF 2ND DOSE IS NEEDED;  

Start 10/3/19 at 21:00


Acetaminophen (Tylenol) 325 mg PRN QHS  PRN PO PAIN,IF 2ND DOSE IS NEEDED;  

Start 10/3/19 at 21:00


Bupropion HCl (Wellbutrin Sr) 150 mg BID PO  Last administered on 10/3/19at 

20:37;  Start 10/3/19 at 10:00


Metformin HCl (Glucophage) 850 mg TIDWMEALS PO  Last administered on 10/3/19at 

17:18;  Start 10/3/19 at 09:30


Furosemide (Lasix) 80 mg DAILY PO  Last administered on 10/3/19at 10:06;  Start 

10/3/19 at 10:00


Insulin Glargine (Lantus Syringe) 50 unit BID SQ  Last administered on 10/3/19at

12:39;  Start 10/3/19 at 10:00;  Stop 10/3/19 at 18:37;  Status DC


Insulin Human Lispro (HumaLOG) 30 units TIDAC SQ  Last administered on 10/3/19at

12:39;  Start 10/3/19 at 09:30;  Stop 10/3/19 at 18:37;  Status DC


Insulin Human Lispro (HumaLOG) 15 units PRN TID  PRN SQ WITH SNACKS;  Start 

10/3/19 at 09:30


Acetaminophen (Tylenol) 500 mg PRN Q6HRS  PRN PO PAIN/FEVER;  Start 10/3/19 at 

13:00


Acetaminophen (Tylenol) 1,000 mg PRN Q6HRS  PRN PO PAIN/FEVER;  Start 10/3/19 at

13:00


Insulin Glargine (Lantus Syringe) 40 unit BID SQ  Last administered on 10/3/19at

20:45;  Start 10/3/19 at 21:00


Insulin Human Lispro (HumaLOG) 25 units TIDAC SQ ;  Start 10/4/19 at 07:30


Sodium Chloride 500 ml @  500 mls/hr 1X  ONCE IV  Last administered on 10/3/19at

20:24;  Start 10/3/19 at 18:45;  Stop 10/3/19 at 19:44;  Status DC





Active Scripts


Active


Reported


Lasix (Furosemide) 80 Mg Tablet 1 Tab PO DAILY


Humulin R U-500 Kwikpen (Insulin Regular, Human) 500 Unit/1 Ml Insuln.pen 90 

Unit SQ DAILYWSUP


Humulin R U-500 Kwikpen (Insulin Regular, Human) 500 Unit/1 Ml Insuln.pen 90 

Unit SQ DAILYWLUN


Humulin R U-500 Kwikpen (Insulin Regular, Human) 500 Unit/1 Ml Insuln.pen 110 

Units SQ DAILYWBKFT


Citalopram Hbr (Citalopram Hydrobromide) 20 Mg Tablet 1 Tab PO DAILY


Levemir (Insulin Detemir) 100 Unit/1 Ml Vial 50 Unit SQ BID


Percocet  Mg Tablet ** (Oxycodone/Acetaminophen) 1 Each Tablet 1 Tab PO 

PRN Q8HRS


Novolog Flexpen (Insulin Aspart) 100 Unit/1 Ml Insuln.pen 15 Unit SQ PRN


Novolog Flexpen (Insulin Aspart) 100 Unit/1 Ml Insuln.pen 30 Unit SQ TIDWMEALS


NITROGLYCERIN SubLingual (Nitroglycerin) 0.4 Mg Tab.subl 0.4 Mg SL PRN Q5MIN PRN


Fluoxetine Hcl 40 Mg Capsule 40 Mg PO DAILY


Docusate Sodium 100 Mg Capsule 100 Mg PO BID


Atorvastatin Calcium 80 Mg Tablet 80 Mg PO HS


Acetaminophen 500 Mg Tablet 1-2 Tab PO Q6HRS


Lisinopril 5 Mg Tablet 1 Tab PO DAILY


Trazodone Hcl 150 Mg Tablet 1 Tab PO QHS PRN


Bupropion Hcl Sr (Bupropion Hcl) 150 Mg Tablet.er 150 Mg PO BID


Metformin Hcl 850 Mg Tablet 850 Mg PO TID


     hold med until 10/5/16





Allergies


Allergies:  


Coded Allergies:  


     cortisone (Verified  Allergy, Intermediate, Pt had swelling at injection 

site, 12/17/15)





ROS


General:  No: Chills, Night Sweats, Fatigue, Malaise, Appetite, Other


PSYCHOLOGICAL ROS:  No: Anxiety, Behavioral Disorder, Concentration difficultie,

Decreased libido, Depression, Disorientation, Hallucinations, Hostility, 

Irritablity, Memory difficulties, Mood Swings, Obsessive thoughts, Physical 

abuse, Sexual abuse, Sleep disturbances, Suicidal ideation, Other


Eyes:  No Blurry vision, No Decreased vision, No Double vision, No Dry eyes, No 

Excessive tearing, No Eye Pain, No Itchy Eyes, No Loss of vision, No Israel

tophobia, No Scotomata, No Uses contacts, No Uses glasses, No Other


HEENT:  No: Heacaches, Visual Changes, Hearing change, Nasal congestion, Nasal 

discharge, Oral lesions, Sinus pain, Sore Throat, Epistaxis, Sneezing, Snoring, 

Tinnitus, Vertigo, Vocal changes, Other


ALLERGY AND IMMUNOLOGY:  No: Hives, Insect Bite Sensitivity, Itchy/Watery Eyes, 

Nasal Congestion, Post Nasal Drip, Seasonal Allergies, Other


Hematological and Lymphatic:  No: Bleeding Problems, Blood Clots, Blood 

Transfusions, Brusing, Night Sweats, Pallor, Swollen Lymph Nodes, Other


ENDOCRINE:  No: Breast Changes, Galactorrhea, Hair Pattern Changes, Hot Flashes,

Malaise/lethargy, Mood Swings, Palpitations, Polydipsia/polyuria, Skin Changes, 

Temperature Intolerance, Unexpected Weight Changes, Other


Respiratory:  No: Cough, Hemoptysis, Orthopnea, Pleuritic Pain, Shortness of 

breath, SOB with excertion, Sputum Changes, Stridor, Tachypnea, Wheezing, Other


Cardiovascular:  No Chest Pain, No Palpitations, No Orthopnea, No Paroxysmal 

Noc. Dyspnea, No Edema, No Lt Headedness, No Other


Gastrointestinal:  No Nausea, No Vomiting, No Abdominal Pain, No Diarrhea, No 

Constipation, No Melena, No Hematochezia, No Other


Genitourinary:  No Dysuria, No Frequency, No Incontinence, No Hematuria, No 

Retention, No Discharge, No Urgency, No Pain, No Flank Pain, No Other, No , No ,

No , No , No , No , No 


Musculoskeletal:  Yes Muscle Pain


Neurological:  Yes Gait Disturbance; 


   No Behavorial Changes, No Bowel/Bladder ControlChng, No Confusion, No 

Dizziness, No Headaches, No Impaired Coord/balance, No Memory Loss, No 

Numbness/Tingling, No Seizures, No Speech Problems, No Tremors, No Visual 

Changes, No Weakness, No Other


Skin:  No Dry Skin, No Eczema, No Hair Changes, No Lumps, No Mole Changes, No 

Mottling, No Nail Changes, No Pruritus, No Rash, No Skin Lesion Changes, No 

Other, No Acne





Physical Exam


General:  Alert, Oriented X3


HEENT:  Atraumatic, EOMI


Lungs:  Other (respirations aren't labored with symmetric chest rise)


Heart:  Regular rate


Abdomen:  Soft, No tenderness


Extremities:  No edema, Other (her cells pedis 1+ and the right)


Skin:  Other (he has scattered superficial ulcers at his right calf)


Neuro:  Normal speech, Sensation intact


Psych/Mental Status:  Mental status NL, Mood NL


MUSCULOSKELETAL:  Other (he is in a short-leg cast on his left lower extremity. 

Examination of his right lower extremity reveals the wounds as noted above, I do

not feel a palpable defect in his Achilles but he has weak plantar flexion. 

Negative Pal.)





Vitals


VITALS





Vital Signs








  Date Time  Temp Pulse Resp B/P (MAP) Pulse Ox O2 Delivery O2 Flow Rate FiO2


 


10/4/19 07:00 98.1 97 18 140/45 (76) 95 Room Air  





 98.1       











Labs


Labs





Laboratory Tests








Test


 10/2/19


17:34 10/2/19


18:00 10/2/19


18:42 10/2/19


19:35


 


Glucose (Fingerstick)


 76 mg/dL


(70-99) 


 96 mg/dL


(70-99) 





 


White Blood Count


 


 11.3 x10^3/uL


(4.0-11.0) 


 





 


Red Blood Count


 


 4.76 x10^6/uL


(4.30-5.70) 


 





 


Hemoglobin


 


 12.1 g/dL


(13.0-17.5) 


 





 


Hematocrit


 


 37.7 %


(39.0-53.0) 


 





 


Mean Corpuscular Volume  79 fL ()   


 


Mean Corpuscular Hemoglobin  25 pg (25-35)   


 


Mean Corpuscular Hemoglobin


Concent 


 32 g/dL


(31-37) 


 





 


Red Cell Distribution Width


 


 19.0 %


(11.5-14.5) 


 





 


Platelet Count


 


 272 x10^3/uL


(140-400) 


 





 


Neutrophils (%) (Auto)  76 % (31-73)   


 


Lymphocytes (%) (Auto)  12 % (24-48)   


 


Monocytes (%) (Auto)  10 % (0-9)   


 


Eosinophils (%) (Auto)  1 % (0-3)   


 


Basophils (%) (Auto)  1 % (0-3)   


 


Neutrophils # (Auto)


 


 8.7 x10^3/uL


(1.8-7.7) 


 





 


Lymphocytes # (Auto)


 


 1.4 x10^3/uL


(1.0-4.8) 


 





 


Monocytes # (Auto)


 


 1.1 x10^3/uL


(0.0-1.1) 


 





 


Eosinophils # (Auto)


 


 0.1 x10^3/uL


(0.0-0.7) 


 





 


Basophils # (Auto)


 


 0.1 x10^3/uL


(0.0-0.2) 


 





 


Sodium Level


 


 138 mmol/L


(136-145) 


 





 


Potassium Level


 


 4.4 mmol/L


(3.5-5.1) 


 





 


Chloride Level


 


 99 mmol/L


() 


 





 


Carbon Dioxide Level


 


 33 mmol/L


(21-32) 


 





 


Anion Gap  6 (6-14)   


 


Blood Urea Nitrogen


 


 30 mg/dL


(8-26) 


 





 


Creatinine


 


 1.7 mg/dL


(0.7-1.3) 


 





 


Estimated GFR


(Cockcroft-Gault) 


 40.7 


 


 





 


BUN/Creatinine Ratio  18 (6-20)   


 


Glucose Level


 


 85 mg/dL


(70-99) 


 





 


Calcium Level


 


 9.6 mg/dL


(8.5-10.1) 


 





 


Magnesium Level


 


 1.8 mg/dL


(1.8-2.4) 


 





 


Total Bilirubin


 


 0.4 mg/dL


(0.2-1.0) 


 





 


Aspartate Amino Transf


(AST/SGOT) 


 28 U/L (15-37) 


 


 





 


Alanine Aminotransferase


(ALT/SGPT) 


 23 U/L (16-63) 


 


 





 


Alkaline Phosphatase


 


 122 U/L


() 


 





 


C-Reactive Protein,


Quantitative 


 20.2 mg/L


(0-3.3) 


 





 


Total Protein


 


 7.7 g/dL


(6.4-8.2) 


 





 


Albumin


 


 3.5 g/dL


(3.4-5.0) 


 





 


Albumin/Globulin Ratio  0.8 (1.0-1.7)   


 


Urine Collection Type    Unknown 


 


Urine Color    Yellow 


 


Urine Clarity    Clear 


 


Urine pH    5.0 


 


Urine Specific Gravity    1.020 


 


Urine Protein


 


 


 


 Negative mg/dL


(NEG-TRACE)


 


Urine Glucose (UA)


 


 


 


 Negative mg/dL


(NEG)


 


Urine Ketones (Stick)


 


 


 


 Negative mg/dL


(NEG)


 


Urine Blood    Negative (NEG) 


 


Urine Nitrite    Negative (NEG) 


 


Urine Bilirubin    Negative (NEG) 


 


Urine Urobilinogen Dipstick


 


 


 


 0.2 mg/dL (0.2


mg/dL)


 


Urine Leukocyte Esterase    Negative (NEG) 


 


Urine RBC


 


 


 


 6-10 /HPF


(0-2)


 


Urine WBC


 


 


 


 11-20 /HPF


(0-4)


 


Urine Squamous Epithelial


Cells 


 


 


 Mod /LPF 





 


Urine Bacteria


 


 


 


 Moderate /HPF


(0-FEW)


 


Urine Hyaline Casts    Many /HPF 


 


Urine Mucus    Marked /LPF 


 


Test


 10/2/19


22:49 10/3/19


07:09 10/3/19


11:36 10/3/19


16:37


 


Glucose (Fingerstick)


 159 mg/dL


(70-99) 188 mg/dL


(70-99) 283 mg/dL


(70-99) 68 mg/dL


(70-99)


 


Test


 10/3/19


18:17 10/3/19


21:36 10/4/19


08:09 





 


Glucose (Fingerstick)


 94 mg/dL


(70-99) 143 mg/dL


(70-99) 170 mg/dL


(70-99) 











Laboratory Tests








Test


 10/3/19


11:36 10/3/19


16:37 10/3/19


18:17 10/3/19


21:36


 


Glucose (Fingerstick)


 283 mg/dL


(70-99) 68 mg/dL


(70-99) 94 mg/dL


(70-99) 143 mg/dL


(70-99)


 


Test


 10/4/19


08:09 


 


 





 


Glucose (Fingerstick)


 170 mg/dL


(70-99) 


 


 














Assessment/Plan


Assessment/Plan


I think it likely that he has a partial rupture on the right side. Given his 

overall clinical scenario and health I would recommend nonoperative treatment, 

he can continue using the Cam boot, I have ordered a heel lift from [facility]. I 

would anticipate he will need placement. He can follow up with me in 2-3 weeks











AMERICO MORROW II, MD         Oct 4, 2019 08:49

## 2019-10-04 NOTE — NUR
YESSENIA consulted for dc planning. Chart reviewed and discussed with RN. Pt lives at home and has 
ruptured his left Achilles tendon in the recent past. Pt has been using a cast on that leg 
but Pt fell at home and ruptured his right Achilles tendon again. YESSENIA requested for PT/OT 
order to eval skilled/rehab needs. Per RN pt already has a cam boot and a cast. 

Plan

1. SW will await for PT/OT assessment to eval dc needs. 

2. Will continue to follow.

## 2019-10-05 VITALS — DIASTOLIC BLOOD PRESSURE: 58 MMHG | SYSTOLIC BLOOD PRESSURE: 122 MMHG

## 2019-10-05 VITALS — DIASTOLIC BLOOD PRESSURE: 61 MMHG | SYSTOLIC BLOOD PRESSURE: 131 MMHG

## 2019-10-05 VITALS — DIASTOLIC BLOOD PRESSURE: 81 MMHG | SYSTOLIC BLOOD PRESSURE: 113 MMHG

## 2019-10-05 VITALS — DIASTOLIC BLOOD PRESSURE: 62 MMHG | SYSTOLIC BLOOD PRESSURE: 123 MMHG

## 2019-10-05 VITALS — DIASTOLIC BLOOD PRESSURE: 43 MMHG | SYSTOLIC BLOOD PRESSURE: 145 MMHG

## 2019-10-05 VITALS — DIASTOLIC BLOOD PRESSURE: 52 MMHG | SYSTOLIC BLOOD PRESSURE: 118 MMHG

## 2019-10-05 PROCEDURE — 5A09357 ASSISTANCE WITH RESPIRATORY VENTILATION, LESS THAN 24 CONSECUTIVE HOURS, CONTINUOUS POSITIVE AIRWAY PRESSURE: ICD-10-PCS | Performed by: INTERNAL MEDICINE

## 2019-10-05 RX ADMIN — VITAMIN D, TAB 1000IU (100/BT) SCH UNIT: 25 TAB at 09:35

## 2019-10-05 RX ADMIN — INSULIN LISPRO SCH UNITS: 100 INJECTION, SOLUTION INTRAVENOUS; SUBCUTANEOUS at 12:17

## 2019-10-05 RX ADMIN — INSULIN GLARGINE SCH UNIT: 100 INJECTION, SOLUTION SUBCUTANEOUS at 21:13

## 2019-10-05 RX ADMIN — INSULIN LISPRO SCH UNITS: 100 INJECTION, SOLUTION INTRAVENOUS; SUBCUTANEOUS at 17:11

## 2019-10-05 RX ADMIN — FUROSEMIDE SCH MG: 80 TABLET ORAL at 09:00

## 2019-10-05 RX ADMIN — BUPROPION HYDROCHLORIDE SCH MG: 150 TABLET, FILM COATED, EXTENDED RELEASE ORAL at 21:07

## 2019-10-05 RX ADMIN — ATORVASTATIN CALCIUM SCH MG: 40 TABLET, FILM COATED ORAL at 21:07

## 2019-10-05 RX ADMIN — BUPROPION HYDROCHLORIDE SCH MG: 150 TABLET, FILM COATED, EXTENDED RELEASE ORAL at 09:35

## 2019-10-05 RX ADMIN — INSULIN LISPRO SCH UNITS: 100 INJECTION, SOLUTION INTRAVENOUS; SUBCUTANEOUS at 09:42

## 2019-10-05 RX ADMIN — LISINOPRIL SCH MG: 5 TABLET ORAL at 09:36

## 2019-10-05 RX ADMIN — DOCUSATE SODIUM SCH MG: 100 CAPSULE, LIQUID FILLED ORAL at 09:35

## 2019-10-05 RX ADMIN — INSULIN GLARGINE SCH UNIT: 100 INJECTION, SOLUTION SUBCUTANEOUS at 09:40

## 2019-10-05 RX ADMIN — ACETAMINOPHEN SCH MG: 325 TABLET, FILM COATED ORAL at 21:05

## 2019-10-05 RX ADMIN — OXYCODONE HYDROCHLORIDE AND ACETAMINOPHEN PRN TAB: 10; 325 TABLET ORAL at 00:00

## 2019-10-05 RX ADMIN — DOCUSATE SODIUM SCH MG: 100 CAPSULE, LIQUID FILLED ORAL at 21:07

## 2019-10-05 RX ADMIN — CITALOPRAM HYDROBROMIDE SCH MG: 20 TABLET ORAL at 09:00

## 2019-10-05 NOTE — PDOC
PROGRESS NOTES


Chief Complaint


Chief Complaint


A/P:


Achilles tendon injury - he felt something snap in his right heel and has pain 

and weakness on dorsiflexion. No quinolone exposure recently that he knows of


Unable to walk - 2/2 cast on LLE and right leg weakness


Hypertension - cont meds


Hyperlipidemia - statin


Symptomatic hypoglycemia - his recent U-500 dosing may play a role, will hold 

this.


Obesity - morbid obesity, counseled on weight loss, consider GLP-1 or SGLT-2


H/o Achilles tendon rupture on the left - in cast currently


CAD - s/p 4x Coronary bypass surgery 12/2015


PPM in situ - dual chamber, [facility] last download 11/15/2018 normal device 

function, AFIB burden <1%, V paced 4.5%. No significant arrhythmia. 


S/p TAVR - 11/2017


Peripheral vascular disease - noted with medical management of bilateral lower 

extremity disease on non-occlusive studies in 2016


Diabetes - seeing [facility], was recently started on U-500, has had 

daily hypoglycemic episodes into the 30s and 40s. Will change him back to a 

lantus BID and lispro TID schedule with sliding scale while in house. Consider 

SGLT-2 or GLP-1





Plan:


I would anticipate he will need placement. He can follow up with Orthopedic 

surgery, Dr. Daigle, in 2-3 weeks. non-operative likely partial tear of right 

achilles tendon, can be placed in CAM boot.





History of Present Illness


History of Present Illness


Mr Mercedes is a 64-year-old male w/ PMHx PPM in situ, s/p TAVR, hypertension, 

hyperlipidemia, obesity, previous Achilles tendon rupture on the left, CAD s/p 

coronary bypass surgery, peripheral vascular disease, diabetes who has been 

recovering in a cast from his left achilles rupture who got up and fell and he 

likely partially ruptured his right Achilles tendon. He is unable to walk and 

will be admitted for further treatment and diagnosis.


10/4: Seen by ortho, no immediate indication for surgery. Has plans for MRI 

outpatient, will need his pacer deactivated for this. Still not able to 

transition or walk well. Needs to have rehab. Low glucose yesterday.





His right heel is extremely painful today. He had no further low blood glucose 

yesterday, insulin adjusted (now he is at 20% of what his outpatient dosing was 

reported to be). Denies CP or SOB. Looking into skilled facilities.





Vitals


Vitals





Vital Signs








  Date Time  Temp Pulse Resp B/P (MAP) Pulse Ox O2 Delivery O2 Flow Rate FiO2


 


10/5/19 07:00 97.5 72 20 113/81 (92) 98 Room Air  





 97.5       











Physical Exam


General:  Alert, Oriented X3


Heart:  Regular rate


Lungs:  Clear, Other


Abdomen:  Soft, No tenderness


Extremities:  No edema, Other (her cells pedis 1+ and the right)


Skin:  Other (he has scattered superficial ulcers at his right calf)





Labs


LABS





Laboratory Tests








Test


 10/4/19


09:20 10/4/19


11:16 10/4/19


16:50 10/4/19


21:13


 


White Blood Count


 7.5 x10^3/uL


(4.0-11.0) 


 


 





 


Red Blood Count


 4.31 x10^6/uL


(4.30-5.70) 


 


 





 


Hemoglobin


 11.1 g/dL


(13.0-17.5) 


 


 





 


Hematocrit


 34.5 %


(39.0-53.0) 


 


 





 


Mean Corpuscular Volume 80 fL ()    


 


Mean Corpuscular Hemoglobin 26 pg (25-35)    


 


Mean Corpuscular Hemoglobin


Concent 32 g/dL


(31-37) 


 


 





 


Red Cell Distribution Width


 18.5 %


(11.5-14.5) 


 


 





 


Platelet Count


 233 x10^3/uL


(140-400) 


 


 





 


Neutrophils (%) (Auto) 72 % (31-73)    


 


Lymphocytes (%) (Auto) 16 % (24-48)    


 


Monocytes (%) (Auto) 10 % (0-9)    


 


Eosinophils (%) (Auto) 1 % (0-3)    


 


Basophils (%) (Auto) 0 % (0-3)    


 


Neutrophils # (Auto)


 5.4 x10^3/uL


(1.8-7.7) 


 


 





 


Lymphocytes # (Auto)


 1.2 x10^3/uL


(1.0-4.8) 


 


 





 


Monocytes # (Auto)


 0.8 x10^3/uL


(0.0-1.1) 


 


 





 


Eosinophils # (Auto)


 0.1 x10^3/uL


(0.0-0.7) 


 


 





 


Basophils # (Auto)


 0.0 x10^3/uL


(0.0-0.2) 


 


 





 


Sodium Level


 135 mmol/L


(136-145) 


 


 





 


Potassium Level


 5.0 mmol/L


(3.5-5.1) 


 


 





 


Chloride Level


 98 mmol/L


() 


 


 





 


Carbon Dioxide Level


 33 mmol/L


(21-32) 


 


 





 


Anion Gap 4 (6-14)    


 


Blood Urea Nitrogen


 27 mg/dL


(8-26) 


 


 





 


Creatinine


 1.5 mg/dL


(0.7-1.3) 


 


 





 


Estimated GFR


(Cockcroft-Gault) 47.0 


 


 


 





 


Glucose Level


 286 mg/dL


(70-99) 


 


 





 


Calcium Level


 8.9 mg/dL


(8.5-10.1) 


 


 





 


Glucose (Fingerstick)


 


 266 mg/dL


(70-99) 89 mg/dL


(70-99) 184 mg/dL


(70-99)


 


Test


 10/5/19


07:27 


 


 





 


Glucose (Fingerstick)


 198 mg/dL


(70-99) 


 


 














Assessment and Plan


Assessmemt and Plan


Problems


Medical Problems:


(1) Hypoglycemia


Status: Acute  





(2) Unable to walk


Status: Acute  











Comment


Review of Relevant


I have reviewed the following items jarrod (where applicable) has been applied.


Labs





Laboratory Tests








Test


 10/3/19


11:36 10/3/19


16:37 10/3/19


18:17 10/3/19


21:36


 


Glucose (Fingerstick)


 283 mg/dL


(70-99) 68 mg/dL


(70-99) 94 mg/dL


(70-99) 143 mg/dL


(70-99)


 


Test


 10/4/19


08:09 10/4/19


09:20 10/4/19


11:16 10/4/19


16:50


 


Glucose (Fingerstick)


 170 mg/dL


(70-99) 


 266 mg/dL


(70-99) 89 mg/dL


(70-99)


 


White Blood Count


 


 7.5 x10^3/uL


(4.0-11.0) 


 





 


Red Blood Count


 


 4.31 x10^6/uL


(4.30-5.70) 


 





 


Hemoglobin


 


 11.1 g/dL


(13.0-17.5) 


 





 


Hematocrit


 


 34.5 %


(39.0-53.0) 


 





 


Mean Corpuscular Volume  80 fL ()   


 


Mean Corpuscular Hemoglobin  26 pg (25-35)   


 


Mean Corpuscular Hemoglobin


Concent 


 32 g/dL


(31-37) 


 





 


Red Cell Distribution Width


 


 18.5 %


(11.5-14.5) 


 





 


Platelet Count


 


 233 x10^3/uL


(140-400) 


 





 


Neutrophils (%) (Auto)  72 % (31-73)   


 


Lymphocytes (%) (Auto)  16 % (24-48)   


 


Monocytes (%) (Auto)  10 % (0-9)   


 


Eosinophils (%) (Auto)  1 % (0-3)   


 


Basophils (%) (Auto)  0 % (0-3)   


 


Neutrophils # (Auto)


 


 5.4 x10^3/uL


(1.8-7.7) 


 





 


Lymphocytes # (Auto)


 


 1.2 x10^3/uL


(1.0-4.8) 


 





 


Monocytes # (Auto)


 


 0.8 x10^3/uL


(0.0-1.1) 


 





 


Eosinophils # (Auto)


 


 0.1 x10^3/uL


(0.0-0.7) 


 





 


Basophils # (Auto)


 


 0.0 x10^3/uL


(0.0-0.2) 


 





 


Sodium Level


 


 135 mmol/L


(136-145) 


 





 


Potassium Level


 


 5.0 mmol/L


(3.5-5.1) 


 





 


Chloride Level


 


 98 mmol/L


() 


 





 


Carbon Dioxide Level


 


 33 mmol/L


(21-32) 


 





 


Anion Gap  4 (6-14)   


 


Blood Urea Nitrogen


 


 27 mg/dL


(8-26) 


 





 


Creatinine


 


 1.5 mg/dL


(0.7-1.3) 


 





 


Estimated GFR


(Cockcroft-Gault) 


 47.0 


 


 





 


Glucose Level


 


 286 mg/dL


(70-99) 


 





 


Calcium Level


 


 8.9 mg/dL


(8.5-10.1) 


 





 


Test


 10/4/19


21:13 10/5/19


07:27 


 





 


Glucose (Fingerstick)


 184 mg/dL


(70-99) 198 mg/dL


(70-99) 


 











Laboratory Tests








Test


 10/4/19


09:20 10/4/19


11:16 10/4/19


16:50 10/4/19


21:13


 


White Blood Count


 7.5 x10^3/uL


(4.0-11.0) 


 


 





 


Red Blood Count


 4.31 x10^6/uL


(4.30-5.70) 


 


 





 


Hemoglobin


 11.1 g/dL


(13.0-17.5) 


 


 





 


Hematocrit


 34.5 %


(39.0-53.0) 


 


 





 


Mean Corpuscular Volume 80 fL ()    


 


Mean Corpuscular Hemoglobin 26 pg (25-35)    


 


Mean Corpuscular Hemoglobin


Concent 32 g/dL


(31-37) 


 


 





 


Red Cell Distribution Width


 18.5 %


(11.5-14.5) 


 


 





 


Platelet Count


 233 x10^3/uL


(140-400) 


 


 





 


Neutrophils (%) (Auto) 72 % (31-73)    


 


Lymphocytes (%) (Auto) 16 % (24-48)    


 


Monocytes (%) (Auto) 10 % (0-9)    


 


Eosinophils (%) (Auto) 1 % (0-3)    


 


Basophils (%) (Auto) 0 % (0-3)    


 


Neutrophils # (Auto)


 5.4 x10^3/uL


(1.8-7.7) 


 


 





 


Lymphocytes # (Auto)


 1.2 x10^3/uL


(1.0-4.8) 


 


 





 


Monocytes # (Auto)


 0.8 x10^3/uL


(0.0-1.1) 


 


 





 


Eosinophils # (Auto)


 0.1 x10^3/uL


(0.0-0.7) 


 


 





 


Basophils # (Auto)


 0.0 x10^3/uL


(0.0-0.2) 


 


 





 


Sodium Level


 135 mmol/L


(136-145) 


 


 





 


Potassium Level


 5.0 mmol/L


(3.5-5.1) 


 


 





 


Chloride Level


 98 mmol/L


() 


 


 





 


Carbon Dioxide Level


 33 mmol/L


(21-32) 


 


 





 


Anion Gap 4 (6-14)    


 


Blood Urea Nitrogen


 27 mg/dL


(8-26) 


 


 





 


Creatinine


 1.5 mg/dL


(0.7-1.3) 


 


 





 


Estimated GFR


(Cockcroft-Gault) 47.0 


 


 


 





 


Glucose Level


 286 mg/dL


(70-99) 


 


 





 


Calcium Level


 8.9 mg/dL


(8.5-10.1) 


 


 





 


Glucose (Fingerstick)


 


 266 mg/dL


(70-99) 89 mg/dL


(70-99) 184 mg/dL


(70-99)


 


Test


 10/5/19


07:27 


 


 





 


Glucose (Fingerstick)


 198 mg/dL


(70-99) 


 


 











Microbiology


10/2/19 Urine Culture - Final, Complete


          


10/2/19 Urine Culture Result 1 (CAROL ANN) - Final, Complete


Medications





Current Medications


Acetaminophen (Tylenol) 500 mg Q6HRS  PRN PO PAIN/FEVER;  Start 10/2/19 at 

22:00;  Stop 10/3/19 at 12:48;  Status DC


Aspirin (Children'S Aspirin) 81 mg DAILYWBKFT PO ;  Start 10/3/19 at 08:00;  

Stop 10/3/19 at 09:35;  Status DC


Vitamin D (Vitamin D3) 1,000 unit DAILY PO  Last administered on 10/4/19at 

09:05;  Start 10/3/19 at 09:00


Citalopram Hydrobromide (CeleXA) 20 mg DAILY PO  Last administered on 10/4/19at 

09:05;  Start 10/3/19 at 09:00


Clopidogrel Bisulfate (Plavix) 75 mg DAILY PO ;  Start 10/3/19 at 09:00;  Stop 

10/3/19 at 09:35;  Status DC


Diclofenac Sodium (Voltaren) 1 pablo BID TP ;  Start 10/3/19 at 09:00;  Stop 

10/3/19 at 09:35;  Status DC


Docusate Sodium (Colace) 100 mg BID PO  Last administered on 10/4/19at 20:55;  

Start 10/3/19 at 09:00


Ergocalciferol (Vitamin D2) 50,000 unit QFR PO  Last administered on 10/4/19at 

17:58;  Start 10/4/19 at 16:00


Lisinopril (Prinivil) 5 mg DAILY PO  Last administered on 10/4/19at 09:05;  Star

t 10/3/19 at 09:00


Metformin HCl (Glucophage) 850 mg BIDWMEALS PO ;  Start 10/3/19 at 08:00;  Stop 

10/3/19 at 09:35;  Status DC


Metoprolol Succinate (Toprol Xl) 25 mg DAILY PO ;  Start 10/3/19 at 09:00;  Stop

10/3/19 at 09:35;  Status DC


Midodrine (Proamatine) 5 mg DAILY PO ;  Start 10/3/19 at 09:00;  Stop 10/3/19 at

09:35;  Status DC


Nitroglycerin (Nitrostat) 0.4 mg PRN Q5MIN  PRN SL CHEST PAIN;  Start 10/2/19 at

22:00


Oxycodone/ Acetaminophen (Percocet 10/325) 1 tab PRN Q8HRS  PRN PO SEVERE PAIN 

Last administered on 10/5/19at 00:00;  Start 10/2/19 at 22:00


Atorvastatin Calcium (Lipitor) 80 mg HS PO  Last administered on 10/4/19at 

20:54;  Start 10/2/19 at 22:45


Bupropion HCl (Wellbutrin Sr) 300 mg DAILY PO ;  Start 10/3/19 at 09:00;  Stop 

10/3/19 at 09:35;  Status DC


Fluoxetine HCl (PROzac) 40 mg DAILY PO ;  Start 10/3/19 at 09:00;  Stop 10/3/19 

at 09:35;  Status DC


Non-Formulary Medication (Tramadol Hcl/ Acetaminophen (Tramadol-Acetaminophn 

37.5-325)) mild pain QHS PO ;  Start 10/3/19 at 21:00;  Status UNV


Trazodone HCl (Desyrel) 150 mg PRN QHS  PRN PO INSOMNIA Last administered on 

10/3/19at 23:30;  Start 10/2/19 at 22:00


Triamcinolone Acetonide (Kenalog 0.1%) 1 pablo BID TP ;  Start 10/3/19 at 09:00;  

Stop 10/3/19 at 09:35;  Status DC


Acetaminophen (Tylenol) 1,000 mg Q6HRS  PRN PO PAIN/FEVER;  Start 10/2/19 at 

22:45;  Stop 10/3/19 at 12:49;  Status DC


Tramadol HCl (Ultram) 37.5 mg QHS PO  Last administered on 10/4/19at 20:57;  

Start 10/3/19 at 21:00


Acetaminophen (Tylenol) 325 mg QHS PO  Last administered on 10/4/19at 20:54;  

Start 10/3/19 at 21:00


Tramadol HCl (Ultram) 37.5 mg PRN QHS  PRN PO PAIN UNREL BY SHO ULTRACET;  Start

10/3/19 at 21:00


Acetaminophen (Tylenol) 325 mg PRN QHS  PRN PO PAIN UNREL BY SHO ULTRACET;  

Start 10/3/19 at 21:00


Bupropion HCl (Wellbutrin Sr) 150 mg BID PO  Last administered on 10/4/19at 

20:54;  Start 10/3/19 at 10:00


Metformin HCl (Glucophage) 850 mg TIDWMEALS PO  Last administered on 10/4/19at 

17:57;  Start 10/3/19 at 09:30


Furosemide (Lasix) 80 mg DAILY PO  Last administered on 10/4/19at 09:05;  Start 

10/3/19 at 10:00


Insulin Glargine (Lantus Syringe) 50 unit BID SQ  Last administered on 10/3/19at

12:39;  Start 10/3/19 at 10:00;  Stop 10/3/19 at 18:37;  Status DC


Insulin Human Lispro (HumaLOG) 30 units TIDAC SQ  Last administered on 10/3/19at

12:39;  Start 10/3/19 at 09:30;  Stop 10/3/19 at 18:37;  Status DC


Insulin Human Lispro (HumaLOG) 15 units PRN TID  PRN SQ WITH SNACKS;  Start 

10/3/19 at 09:30


Acetaminophen (Tylenol) 500 mg PRN Q6HRS  PRN PO MILD PAIN/FEVER;  Start 10/3/19

at 13:00


Acetaminophen (Tylenol) 1,000 mg PRN Q6HRS  PRN PO MILD PAIN/FEVER;  Start 

10/3/19 at 13:00


Insulin Glargine (Lantus Syringe) 40 unit BID SQ  Last administered on 10/4/19at

21:05;  Start 10/3/19 at 21:00


Insulin Human Lispro (HumaLOG) 25 units TIDAC SQ  Last administered on 10/4/19at

12:16;  Start 10/4/19 at 07:30


Sodium Chloride 500 ml @  500 mls/hr 1X  ONCE IV  Last administered on 10/3/19at

20:24;  Start 10/3/19 at 18:45;  Stop 10/3/19 at 19:44;  Status DC





Active Scripts


Active


Reported


Lasix (Furosemide) 80 Mg Tablet 1 Tab PO DAILY


Humulin R U-500 Kwikpen (Insulin Regular, Human) 500 Unit/1 Ml Insuln.pen 90 

Unit SQ DAILYWSUP


Humulin R U-500 Kwikpen (Insulin Regular, Human) 500 Unit/1 Ml Insuln.pen 90 

Unit SQ DAILYWLUN


Humulin R U-500 Kwikpen (Insulin Regular, Human) 500 Unit/1 Ml Insuln.pen 110 

Units SQ DAILYWBKFT


Citalopram Hbr (Citalopram Hydrobromide) 20 Mg Tablet 1 Tab PO DAILY


Levemir (Insulin Detemir) 100 Unit/1 Ml Vial 50 Unit SQ BID


Percocet  Mg Tablet ** (Oxycodone/Acetaminophen) 1 Each Tablet 1 Tab PO 

PRN Q8HRS


Novolog Flexpen (Insulin Aspart) 100 Unit/1 Ml Insuln.pen 15 Unit SQ PRN


Novolog Flexpen (Insulin Aspart) 100 Unit/1 Ml Insuln.pen 30 Unit SQ TIDWMEALS


NITROGLYCERIN SubLingual (Nitroglycerin) 0.4 Mg Tab.subl 0.4 Mg SL PRN Q5MIN PRN


Fluoxetine Hcl 40 Mg Capsule 40 Mg PO DAILY


Docusate Sodium 100 Mg Capsule 100 Mg PO BID


Atorvastatin Calcium 80 Mg Tablet 80 Mg PO HS


Acetaminophen 500 Mg Tablet 1-2 Tab PO Q6HRS


Lisinopril 5 Mg Tablet 1 Tab PO DAILY


Trazodone Hcl 150 Mg Tablet 1 Tab PO QHS PRN


Bupropion Hcl Sr (Bupropion Hcl) 150 Mg Tablet.er 150 Mg PO BID


Metformin Hcl 850 Mg Tablet 850 Mg PO TID


     hold med until 10/5/16


Vitals/I & O





Vital Sign - Last 24 Hours








 10/4/19 10/4/19 10/4/19 10/4/19





 09:04 09:05 11:00 15:00


 


Temp   98.0 97.5





   98.0 97.5


 


Pulse  97 91 72


 


Resp   20 20


 


B/P (MAP)  140/45 117/37 (63) 137/47 (77)


 


Pulse Ox 95  91 97


 


O2 Delivery Room Air  Room Air Room Air


 


    





    





 10/4/19 10/4/19 10/4/19 10/4/19





 19:00 20:00 20:57 21:57


 


Temp 99.0   





 99.0   


 


Pulse 94   


 


Resp 20  16 16


 


B/P (MAP) 129/65 (86)   


 


Pulse Ox 94  97 94


 


O2 Delivery  Room Air Room Air Room Air


 


    





    





 10/4/19 10/5/19 10/5/19 10/5/19





 22:41 00:00 01:36 03:36


 


Temp 98.3   98.4





 98.3   98.4


 


Pulse 96   95


 


Resp 22 16 16 22


 


B/P (MAP) 123/61 (81)   131/61 (84)


 


Pulse Ox 94 94  93


 


O2 Delivery Room Air Room Air Room Air Room Air


 


    





    





 10/5/19   





 07:00   


 


Temp 97.5   





 97.5   


 


Pulse 72   


 


Resp 20   


 


B/P (MAP) 113/81 (92)   


 


Pulse Ox 98   


 


O2 Delivery Room Air   














Intake and Output   


 


 10/4/19 10/4/19 10/5/19





 15:00 23:00 07:00


 


Intake Total 480 ml  640 ml


 


Output Total  200 ml 400 ml


 


Balance 480 ml -200 ml 240 ml

















DANIELA TADEO MD         Oct 5, 2019 08:46

## 2019-10-06 VITALS — SYSTOLIC BLOOD PRESSURE: 145 MMHG | DIASTOLIC BLOOD PRESSURE: 74 MMHG

## 2019-10-06 VITALS — SYSTOLIC BLOOD PRESSURE: 126 MMHG | DIASTOLIC BLOOD PRESSURE: 45 MMHG

## 2019-10-06 VITALS — DIASTOLIC BLOOD PRESSURE: 57 MMHG | SYSTOLIC BLOOD PRESSURE: 143 MMHG

## 2019-10-06 VITALS — DIASTOLIC BLOOD PRESSURE: 66 MMHG | SYSTOLIC BLOOD PRESSURE: 126 MMHG

## 2019-10-06 VITALS — DIASTOLIC BLOOD PRESSURE: 58 MMHG | SYSTOLIC BLOOD PRESSURE: 122 MMHG

## 2019-10-06 VITALS — DIASTOLIC BLOOD PRESSURE: 79 MMHG | SYSTOLIC BLOOD PRESSURE: 124 MMHG

## 2019-10-06 LAB
ALBUMIN SERPL-MCNC: 3.5 G/DL (ref 3.4–5)
ANION GAP SERPL CALC-SCNC: 9 MMOL/L (ref 6–14)
BUN SERPL-MCNC: 21 MG/DL (ref 8–26)
CALCIUM SERPL-MCNC: 9.5 MG/DL (ref 8.5–10.1)
CHLORIDE SERPL-SCNC: 100 MMOL/L (ref 98–107)
CO2 SERPL-SCNC: 30 MMOL/L (ref 21–32)
CREAT SERPL-MCNC: 1.2 MG/DL (ref 0.7–1.3)
GFR SERPLBLD BASED ON 1.73 SQ M-ARVRAT: 60.8 ML/MIN
GLUCOSE SERPL-MCNC: 64 MG/DL (ref 70–99)
PHOSPHATE SERPL-MCNC: 3.5 MG/DL (ref 2.6–4.7)
POTASSIUM SERPL-SCNC: 4.7 MMOL/L (ref 3.5–5.1)
SODIUM SERPL-SCNC: 139 MMOL/L (ref 136–145)

## 2019-10-06 PROCEDURE — 5A09357 ASSISTANCE WITH RESPIRATORY VENTILATION, LESS THAN 24 CONSECUTIVE HOURS, CONTINUOUS POSITIVE AIRWAY PRESSURE: ICD-10-PCS | Performed by: INTERNAL MEDICINE

## 2019-10-06 RX ADMIN — LISINOPRIL SCH MG: 5 TABLET ORAL at 09:52

## 2019-10-06 RX ADMIN — DOCUSATE SODIUM SCH MG: 100 CAPSULE, LIQUID FILLED ORAL at 09:51

## 2019-10-06 RX ADMIN — INSULIN GLARGINE SCH UNIT: 100 INJECTION, SOLUTION SUBCUTANEOUS at 21:12

## 2019-10-06 RX ADMIN — INSULIN LISPRO SCH UNITS: 100 INJECTION, SOLUTION INTRAVENOUS; SUBCUTANEOUS at 12:48

## 2019-10-06 RX ADMIN — FUROSEMIDE SCH MG: 80 TABLET ORAL at 09:51

## 2019-10-06 RX ADMIN — INSULIN LISPRO SCH UNITS: 100 INJECTION, SOLUTION INTRAVENOUS; SUBCUTANEOUS at 09:59

## 2019-10-06 RX ADMIN — ATORVASTATIN CALCIUM SCH MG: 40 TABLET, FILM COATED ORAL at 21:05

## 2019-10-06 RX ADMIN — DOCUSATE SODIUM SCH MG: 100 CAPSULE, LIQUID FILLED ORAL at 21:06

## 2019-10-06 RX ADMIN — VITAMIN D, TAB 1000IU (100/BT) SCH UNIT: 25 TAB at 09:51

## 2019-10-06 RX ADMIN — ACETAMINOPHEN SCH MG: 325 TABLET, FILM COATED ORAL at 21:06

## 2019-10-06 RX ADMIN — CITALOPRAM HYDROBROMIDE SCH MG: 20 TABLET ORAL at 09:51

## 2019-10-06 RX ADMIN — BACITRACIN SCH MLS/HR: 5000 INJECTION, POWDER, FOR SOLUTION INTRAMUSCULAR at 17:11

## 2019-10-06 RX ADMIN — BUPROPION HYDROCHLORIDE SCH MG: 150 TABLET, FILM COATED, EXTENDED RELEASE ORAL at 21:05

## 2019-10-06 RX ADMIN — INSULIN GLARGINE SCH UNIT: 100 INJECTION, SOLUTION SUBCUTANEOUS at 09:58

## 2019-10-06 RX ADMIN — BUPROPION HYDROCHLORIDE SCH MG: 150 TABLET, FILM COATED, EXTENDED RELEASE ORAL at 09:51

## 2019-10-06 RX ADMIN — INSULIN LISPRO SCH UNITS: 100 INJECTION, SOLUTION INTRAVENOUS; SUBCUTANEOUS at 17:17

## 2019-10-06 RX ADMIN — TRAZODONE HYDROCHLORIDE PRN MG: 50 TABLET ORAL at 21:06

## 2019-10-06 NOTE — PDOC
PROGRESS NOTES


Chief Complaint


Chief Complaint


IMPRESSION








Achilles tendon injury - he felt something snap in his right heel and has pain 

and weakness on dorsiflexion. No quinolone exposure r


Near complete rupture of the Achilles tendon from the calcaneal 


insertion, with 3.5 cm proximal retraction.


Anterior talofibular and calcaneofibular ligament sprains.


HEIDY


 


GAIT INSTABILITY- 2/2 cast on LLE and right leg weakness


Hypertension - cont meds


Hyperlipidemia - statin


Symptomatic hypoglycemia - his recent U-500 dosing may play a role, will hold 

this.


Obesity - morbid obesity, counseled on weight loss, consider GLP-1 or SGLT-2


H/o Achilles tendon rupture on the left - in cast currently


CAD - s/p 4x Coronary bypass surgery 12/2015


PPM in situ - dual chamber, [facility] last download 11/15/2018 normal device 

function, AFIB burden <1%, V paced 4.5%. No significant arrhythmia. 


S/p TAVR - 11/2017


Peripheral vascular disease - noted with medical management of bilateral lower 

extremity disease on non-occlusive studies in 2016


Diabetes - seeing [facility], was recently started on U-500, has had 

daily hypoglycemic episodes into the 30s and 40s./// lantus BID and lispro TID 

schedule with sliding scale while in house. Consider SGLT-2 or GLP-1





Plan:


SNF  placement. He can follow up with Orthopedic surgery, Dr. Daigle, in 2-3 

weeks. non-operative likely partial tear of right achilles tendon, can be placed

in CAM boot.


NEPHROLOGY CONSULT











27 MIN PT EXAM, CHART REVIEW, > 50% OF TIME SPENT WITH EXAM, CHART REVIEW, PT 

CARE COORDINATION





History of Present Illness


History of Present Illness


Mr Mercedes is a 64-year-old male w/ PMHx PPM in situ, s/p TAVR, hypertension, 

hyperlipidemia, obesity, previous Achilles tendon rupture on the left, CAD s/p 

coronary bypass surgery, peripheral vascular disease, diabetes who has been re

covering in a cast from his left achilles rupture who got up and fell and he 

likely partially ruptured his right Achilles tendon. He is unable to walk and 

will be admitted for further treatment and diagnosis.


10/4: Seen by ortho, no immediate indication for surgery. Has plans for MRI 

outpatient, will need his pacer deactivated for this. Still not able to 

transition or walk well. Needs to have rehab. Low glucose yesterday.





His right heel is extremely painful today. He had no further low blood glucose 

yesterday, insulin adjusted (now he is at 20% of what his outpatient dosing was 

reported to be). Denies CP or SOB. Looking into skilled facilities.





Vitals


Vitals





Vital Signs








  Date Time  Temp Pulse Resp B/P (MAP) Pulse Ox O2 Delivery O2 Flow Rate FiO2


 


10/6/19 09:52  85  126/66    


 


10/6/19 08:04 98.7  18  93 Room Air  





 98.7       











Physical Exam


General:  Alert, Oriented X3


Heart:  Regular rate


Lungs:  Clear, Other


Abdomen:  Soft, No tenderness


Extremities:  No edema, Other (her cells pedis 1+ and the right)


Skin:  Other (he has scattered superficial ulcers at his right calf)





Labs


LABS





MR of the left ankle


 


HISTORY: Achilles tendon pain.


 


TECHNIQUE: Routine multiplanar sequences are obtained.


 


FINDINGS:


 


1. Complete rupture of the Achilles tendon from the insertion, involving 


greater than 90 percent of the fibers. There is a very thin irregular 


remnant of the tendon maintaining continuity. The torn tendon is 


proximally retracted 3.5 cm. Heterogeneous fluid or hemorrhage within the 


retrocalcaneal bursa. Moderate size enthesophyte at the calcaneal 


insertion, with some tendinous calcaneal marrow edema.


 


Peroneal tendons are intact. Mild thickening and signal within the 


anterior talofibular ligament and calcaneofibular ligament compatible with


sprain. Posterior talofibular ligament is intact. The tibiofibular 


syndesmotic ligaments are intact.


 


Posterior tibial and flexor tendons are intact. No acute medial 


ligamentous tear.


 


Anterior tibial and extensor tendons are intact. No acute plantar 


fasciitis.


 


Subtalar joints are mildly degenerative but patent. Tarsal sinus is 


intact. Talar dome is intact.


 


Diffuse subcutaneous edema around the ankle.


 


IMPRESSION:


1. Near complete rupture of the Achilles tendon from the calcaneal 


insertion, with 3.5 cm proximal retraction.


2. Anterior talofibular and calcaneofibular ligament sprains.


 


Electronically signed by: Cruz Ayala MD (9/25/2019 4:39 PM) [facility]














DICTATED and SIGNED BY:     CRUZ AYALA MD


DATE:     09/25/19 6462


Laboratory Tests








Test


 10/5/19


16:39 10/5/19


20:31 10/6/19


08:02


 


Glucose (Fingerstick)


 101 mg/dL


(70-99) 122 mg/dL


(70-99) 178 mg/dL


(70-99)











Assessment and Plan


Assessmemt and Plan


Problems


Medical Problems:


(1) Hypoglycemia


Status: Acute  





(2) Unable to walk


Status: Acute  





* WNL


Coordination 


* No Deficits


Activity Tolerance 


* Fair -


Activity Tolerance/ Vitals 


* no signs/symptoms of distress


   VSS


Sitting Balance 


* 4 moves 1-2" from COG


Standing Balance 


* 2 balances w/ both UEs


Objective Measures Comment 


* Pt used RW to stand


Pain Score 


* 7


Pain Location 


* B Achilles Tendons


Pain Quality 


* Sore


Supine to Sit Assistance Required 


* Stand-by Assistance


Sit to Supine Assistance Required 


* Stand-by Assistance


Transfer Assistance Required 


* Stand-by Assistance


Transfer Type 


* Sit to Stand


Transfer Assistive Device 


* Roller Walker


Ambulation Comments 


* Pt. took 3-4 steps away from chair.  Educated prior to transfer and 


   reminded he is allowed to transfer only.


Sitting Exercises 


* Long Arc Quads


* Marching


Sitting Exercises Comments 


* B x 7 reps


Other Information 


* Pt. up in chair with call light and family available. 


Rehab Potential to Achieve Goals 


* Good


Learning Preferences


* One-on-One Instruction


* Demonstration


Problem List (body system elements) 


* Impaired fnctnl mobility


* Strength


* Obesity


* Knowledge-Precautions


* Balance


* Knowledge-Body Mechanics


* Knowledge-safe techniques


* Pain


Patient condition at conclusion of therapy 


* Pt in chair


* Call light in reach


* Phone in reach


* PtIn no apparent distress


* Pt denies further needs


* Visitor with patient


Communicated Patient Care With (Name, Title) 


* MARCIE Becker


Goal 1 - Bed Mobility Assistance Required 


* Independent


Goal 1 Assessment 


* Appropriate - Continue


Goal 2 - Transfers Assistance Required 


* Independent


Goal 2 - Transfer Type 


* Stand-Pivot


Goal 2 Assessment 


* Appropriate - Continue


Goal 5 


* LE home exercise program


Goal 5 Assessment 


* Appropriate - Continue


Treatment Plan 


* Therapeutic Exercise


* Bed Mobility Training


Frequency of Treatment Expected 


* 6 visits/week


Duration of Treatment Expected 


* 1 week


Discharge Recommendations 


* Skilled Nursing Unit


Discharge Recommendation Comments 


* will continue to assess





Comment


Review of Relevant


I have reviewed the following items jarrod (where applicable) has been applied.


Labs





Laboratory Tests








Test


 10/4/19


16:50 10/4/19


21:13 10/5/19


07:27 10/5/19


10:29


 


Glucose (Fingerstick)


 89 mg/dL


(70-99) 184 mg/dL


(70-99) 198 mg/dL


(70-99) 221 mg/dL


(70-99)


 


Test


 10/5/19


16:39 10/5/19


20:31 10/6/19


08:02 





 


Glucose (Fingerstick)


 101 mg/dL


(70-99) 122 mg/dL


(70-99) 178 mg/dL


(70-99) 











Laboratory Tests








Test


 10/5/19


16:39 10/5/19


20:31 10/6/19


08:02


 


Glucose (Fingerstick)


 101 mg/dL


(70-99) 122 mg/dL


(70-99) 178 mg/dL


(70-99)








Microbiology


10/2/19 Urine Culture - Final, Complete


          


10/2/19 Urine Culture Result 1 (CAROL ANN) - Final, Complete


Medications





Current Medications


Acetaminophen (Tylenol) 500 mg Q6HRS  PRN PO PAIN/FEVER;  Start 10/2/19 at 

22:00;  Stop 10/3/19 at 12:48;  Status DC


Aspirin (Children'S Aspirin) 81 mg DAILYWBKFT PO ;  Start 10/3/19 at 08:00;  

Stop 10/3/19 at 09:35;  Status DC


Vitamin D (Vitamin D3) 1,000 unit DAILY PO  Last administered on 10/6/19at 

09:51;  Start 10/3/19 at 09:00


Citalopram Hydrobromide (CeleXA) 20 mg DAILY PO  Last administered on 10/6/19at 

09:51;  Start 10/3/19 at 09:00


Clopidogrel Bisulfate (Plavix) 75 mg DAILY PO ;  Start 10/3/19 at 09:00;  Stop 

10/3/19 at 09:35;  Status DC


Diclofenac Sodium (Voltaren) 1 pablo BID TP ;  Start 10/3/19 at 09:00;  Stop 1

0/3/19 at 09:35;  Status DC


Docusate Sodium (Colace) 100 mg BID PO  Last administered on 10/6/19at 09:51;  

Start 10/3/19 at 09:00


Ergocalciferol (Vitamin D2) 50,000 unit QFR PO  Last administered on 10/4/19at 

17:58;  Start 10/4/19 at 16:00


Lisinopril (Prinivil) 5 mg DAILY PO  Last administered on 10/6/19at 09:52;  

Start 10/3/19 at 09:00


Metformin HCl (Glucophage) 850 mg BIDWMEALS PO ;  Start 10/3/19 at 08:00;  Stop 

10/3/19 at 09:35;  Status DC


Metoprolol Succinate (Toprol Xl) 25 mg DAILY PO ;  Start 10/3/19 at 09:00;  Stop

10/3/19 at 09:35;  Status DC


Midodrine (Proamatine) 5 mg DAILY PO ;  Start 10/3/19 at 09:00;  Stop 10/3/19 at

09:35;  Status DC


Nitroglycerin (Nitrostat) 0.4 mg PRN Q5MIN  PRN SL CHEST PAIN;  Start 10/2/19 at

22:00


Oxycodone/ Acetaminophen (Percocet 10/325) 1 tab PRN Q8HRS  PRN PO SEVERE PAIN 

Last administered on 10/5/19at 00:00;  Start 10/2/19 at 22:00


Atorvastatin Calcium (Lipitor) 80 mg HS PO  Last administered on 10/5/19at 

21:07;  Start 10/2/19 at 22:45


Bupropion HCl (Wellbutrin Sr) 300 mg DAILY PO ;  Start 10/3/19 at 09:00;  Stop 

10/3/19 at 09:35;  Status DC


Fluoxetine HCl (PROzac) 40 mg DAILY PO ;  Start 10/3/19 at 09:00;  Stop 10/3/19 

at 09:35;  Status DC


Non-Formulary Medication (Tramadol Hcl/ Acetaminophen (Tramadol-Acetaminophn 37.

5-325)) mild pain QHS PO ;  Start 10/3/19 at 21:00;  Status UNV


Trazodone HCl (Desyrel) 150 mg PRN QHS  PRN PO INSOMNIA Last administered on 

10/3/19at 23:30;  Start 10/2/19 at 22:00


Triamcinolone Acetonide (Kenalog 0.1%) 1 pablo BID TP ;  Start 10/3/19 at 09:00;  

Stop 10/3/19 at 09:35;  Status DC


Acetaminophen (Tylenol) 1,000 mg Q6HRS  PRN PO PAIN/FEVER;  Start 10/2/19 at 

22:45;  Stop 10/3/19 at 12:49;  Status DC


Tramadol HCl (Ultram) 37.5 mg QHS PO  Last administered on 10/4/19at 20:57;  

Start 10/3/19 at 21:00


Acetaminophen (Tylenol) 325 mg QHS PO  Last administered on 10/5/19at 21:05;  

Start 10/3/19 at 21:00


Tramadol HCl (Ultram) 37.5 mg PRN QHS  PRN PO PAIN UNREL BY SHO ULTRACET;  Start

10/3/19 at 21:00


Acetaminophen (Tylenol) 325 mg PRN QHS  PRN PO PAIN UNREL BY SHO ULTRACET;  

Start 10/3/19 at 21:00


Bupropion HCl (Wellbutrin Sr) 150 mg BID PO  Last administered on 10/6/19at 

09:51;  Start 10/3/19 at 10:00


Metformin HCl (Glucophage) 850 mg TIDWMEALS PO  Last administered on 10/6/19at 

09:52;  Start 10/3/19 at 09:30


Furosemide (Lasix) 80 mg DAILY PO  Last administered on 10/6/19at 09:51;  Start 

10/3/19 at 10:00


Insulin Glargine (Lantus Syringe) 50 unit BID SQ  Last administered on 10/3/19at

12:39;  Start 10/3/19 at 10:00;  Stop 10/3/19 at 18:37;  Status DC


Insulin Human Lispro (HumaLOG) 30 units TIDAC SQ  Last administered on 10/3/19at

12:39;  Start 10/3/19 at 09:30;  Stop 10/3/19 at 18:37;  Status DC


Insulin Human Lispro (HumaLOG) 15 units PRN TID  PRN SQ WITH SNACKS;  Start 

10/3/19 at 09:30


Acetaminophen (Tylenol) 500 mg PRN Q6HRS  PRN PO MILD PAIN/FEVER;  Start 10/3/19

at 13:00


Acetaminophen (Tylenol) 1,000 mg PRN Q6HRS  PRN PO MILD PAIN/FEVER;  Start 

10/3/19 at 13:00


Insulin Glargine (Lantus Syringe) 40 unit BID SQ  Last administered on 10/6/19at

09:58;  Start 10/3/19 at 21:00


Insulin Human Lispro (HumaLOG) 25 units TIDAC SQ  Last administered on 10/6/19at

09:59;  Start 10/4/19 at 07:30


Sodium Chloride 500 ml @  500 mls/hr 1X  ONCE IV  Last administered on 10/3/19at

20:24;  Start 10/3/19 at 18:45;  Stop 10/3/19 at 19:44;  Status DC





Active Scripts


Active


Reported


Lasix (Furosemide) 80 Mg Tablet 1 Tab PO DAILY


Humulin R U-500 Kwikpen (Insulin Regular, Human) 500 Unit/1 Ml Insuln.pen 90 

Unit SQ DAILYWSUP


Humulin R U-500 Kwikpen (Insulin Regular, Human) 500 Unit/1 Ml Insuln.pen 90 

Unit SQ DAILYWLUN


Humulin R U-500 Kwikpen (Insulin Regular, Human) 500 Unit/1 Ml Insuln.pen 110 

Units SQ DAILYWBKFT


Citalopram Hbr (Citalopram Hydrobromide) 20 Mg Tablet 1 Tab PO DAILY


Levemir (Insulin Detemir) 100 Unit/1 Ml Vial 50 Unit SQ BID


Percocet  Mg Tablet ** (Oxycodone/Acetaminophen) 1 Each Tablet 1 Tab PO 

PRN Q8HRS


Novolog Flexpen (Insulin Aspart) 100 Unit/1 Ml Insuln.pen 15 Unit SQ PRN


Novolog Flexpen (Insulin Aspart) 100 Unit/1 Ml Insuln.pen 30 Unit SQ TIDWMEALS


NITROGLYCERIN SubLingual (Nitroglycerin) 0.4 Mg Tab.subl 0.4 Mg SL PRN Q5MIN PRN


Fluoxetine Hcl 40 Mg Capsule 40 Mg PO DAILY


Docusate Sodium 100 Mg Capsule 100 Mg PO BID


Atorvastatin Calcium 80 Mg Tablet 80 Mg PO HS


Acetaminophen 500 Mg Tablet 1-2 Tab PO Q6HRS


Lisinopril 5 Mg Tablet 1 Tab PO DAILY


Trazodone Hcl 150 Mg Tablet 1 Tab PO QHS PRN


Bupropion Hcl Sr (Bupropion Hcl) 150 Mg Tablet.er 150 Mg PO BID


Metformin Hcl 850 Mg Tablet 850 Mg PO TID


     hold med until 10/5/16


Vitals/I & O





Vital Sign - Last 24 Hours








 10/5/19 10/5/19 10/5/19 10/5/19





 14:49 19:00 20:00 23:03


 


Temp 98.0 98.7  98.2





 98.0 98.7  98.2


 


Pulse 81 90  69


 


Resp 20 20  20


 


B/P (MAP) 123/62 (82) 145/43 (77)  122/58 (79)


 


Pulse Ox 96 92  98


 


O2 Delivery Room Air Room Air Room Air BiPAP/CPAP


 


    





    





 10/6/19 10/6/19 10/6/19 10/6/19





 00:03 02:15 03:00 04:04


 


Temp   97.7 





   97.7 


 


Pulse   85 


 


Resp   20 


 


B/P (MAP)   126/66 (86) 


 


Pulse Ox   98 


 


O2 Delivery BiPAP/CPAP BiPAP/CPAP BiPAP/CPAP BiPAP/CPAP


 


    





    





 10/6/19 10/6/19  





 08:04 09:52  


 


Temp 98.7   





 98.7   


 


Pulse 81 85  


 


Resp 18   


 


B/P (MAP) 143/57 (85) 126/66  


 


Pulse Ox 93   


 


O2 Delivery Room Air   














Intake and Output   


 


 10/5/19 10/5/19 10/6/19





 15:00 23:00 07:00


 


Intake Total 550 ml 300 ml 


 


Output Total 470 ml 250 ml 400 ml


 


Balance 80 ml 50 ml -400 ml

















DIANA MIRAMONTES MD           Oct 6, 2019 11:19

## 2019-10-07 VITALS — DIASTOLIC BLOOD PRESSURE: 65 MMHG | SYSTOLIC BLOOD PRESSURE: 118 MMHG

## 2019-10-07 VITALS
SYSTOLIC BLOOD PRESSURE: 151 MMHG | SYSTOLIC BLOOD PRESSURE: 151 MMHG | DIASTOLIC BLOOD PRESSURE: 66 MMHG | DIASTOLIC BLOOD PRESSURE: 66 MMHG | DIASTOLIC BLOOD PRESSURE: 66 MMHG | SYSTOLIC BLOOD PRESSURE: 151 MMHG

## 2019-10-07 VITALS — SYSTOLIC BLOOD PRESSURE: 146 MMHG | DIASTOLIC BLOOD PRESSURE: 68 MMHG

## 2019-10-07 RX ADMIN — INSULIN GLARGINE SCH UNIT: 100 INJECTION, SOLUTION SUBCUTANEOUS at 08:46

## 2019-10-07 RX ADMIN — LISINOPRIL SCH MG: 5 TABLET ORAL at 08:35

## 2019-10-07 RX ADMIN — INSULIN LISPRO SCH UNITS: 100 INJECTION, SOLUTION INTRAVENOUS; SUBCUTANEOUS at 12:09

## 2019-10-07 RX ADMIN — DOCUSATE SODIUM SCH MG: 100 CAPSULE, LIQUID FILLED ORAL at 08:36

## 2019-10-07 RX ADMIN — FUROSEMIDE SCH MG: 80 TABLET ORAL at 08:36

## 2019-10-07 RX ADMIN — BACITRACIN SCH MLS/HR: 5000 INJECTION, POWDER, FOR SOLUTION INTRAMUSCULAR at 06:21

## 2019-10-07 RX ADMIN — VITAMIN D, TAB 1000IU (100/BT) SCH UNIT: 25 TAB at 08:36

## 2019-10-07 RX ADMIN — BUPROPION HYDROCHLORIDE SCH MG: 150 TABLET, FILM COATED, EXTENDED RELEASE ORAL at 08:35

## 2019-10-07 RX ADMIN — CITALOPRAM HYDROBROMIDE SCH MG: 20 TABLET ORAL at 08:36

## 2019-10-07 RX ADMIN — INSULIN LISPRO SCH UNITS: 100 INJECTION, SOLUTION INTRAVENOUS; SUBCUTANEOUS at 08:47

## 2019-10-07 NOTE — PDOC
PROGRESS NOTES


Chief Complaint


Chief Complaint


DISCHARGE DX ======================








Achilles tendon injury - he felt something snap in his right heel and has pain 

and weakness on dorsiflexion. No quinolone exposure r


Near complete rupture of the Achilles tendon from the calcaneal 


insertion, with 3.5 cm proximal retraction.


Anterior talofibular and calcaneofibular ligament sprains.


HEIDY, vasomotor nephropathy


 


GAIT INSTABILITY- 2/2 cast on LLE and right leg weakness


Hypertension - cont meds


Hyperlipidemia - statin


Symptomatic hypoglycemia - his recent U-500 dosing may play a role, will hold 

this.


Obesity - morbid obesity, counseled on weight loss, consider GLP-1 or SGLT-2


H/o Achilles tendon rupture on the left - in cast currently


CAD - s/p 4x Coronary bypass surgery 12/2015


PPM in situ - dual chamber, [facility] last download 11/15/2018 normal device 

function, AFIB burden <1%, V paced 4.5%. No significant arrhythmia. 


S/p TAVR - 11/2017


Peripheral vascular disease - noted with medical management of bilateral lower 

extremity disease on non-occlusive studies in 2016


Diabetes - seeing [facility], was recently started on U-500, has had 

daily hypoglycemic episodes into the 30s and 40s./// lantus BID and lispro TID 

schedule with sliding scale while in house. Consider SGLT-2 or GLP-1





Plan:


SNF  placement. He can follow up with Orthopedic surgery, Dr. Daigle, in 2-3 

weeks. non-operative likely partial tear of right achilles tendon, can be placed

in CAM boot.


NEPHROLOGY CONSULT











34  MIN PT EXAM, CHART REVIEW D/C PLANNING  , > 50% OF TIME SPENT WITH EXAM, 

CHART REVIEW, PT CARE COORDINATION





History of Present Illness


History of Present Illness


Mr Mercedes is a 64-year-old male w/ PMHx PPM in situ, s/p TAVR, hypertension, 

hyperlipidemia, obesity, previous Achilles tendon rupture on the left, CAD s/p 

coronary bypass surgery, peripheral vascular disease, diabetes who has been 

recovering in a cast from his left achilles rupture who got up and fell and he 

likely partially ruptured his right Achilles tendon. He is unable to walk and 

will be admitted for further treatment and diagnosis.


10/4: Seen by ortho, no immediate indication for surgery. Has plans for MRI 

outpatient, will need his pacer deactivated for this. Still not able to 

transition or walk well. Needs to have rehab. Low glucose yesterday.





His right heel is extremely painful today. He had no further low blood glucose 

yesterday, insulin adjusted (now he is at 20% of what his outpatient dosing was 

reported to be). Denies CP or SOB. Looking into skilled facilities.





Vitals


Vitals





Vital Signs








  Date Time  Temp Pulse Resp B/P (MAP) Pulse Ox O2 Delivery O2 Flow Rate FiO2


 


10/7/19 08:35  19  146/68    


 


10/7/19 08:00      Room Air  


 


10/7/19 07:00 98.0  19  93   





 98.0       











Physical Exam


General:  Alert, Oriented X3, Cooperative


Heart:  Regular rate


Lungs:  Clear, Other


Abdomen:  Normal bowel sounds, Soft, No tenderness


Extremities:  No cyanosis, No edema, Other (her cells pedis 1+ and the right)


Skin:  Other (he has scattered superficial ulcers at his right calf)





Labs


LABS





Laboratory Tests








Test


 10/6/19


12:10 10/6/19


15:35 10/6/19


16:52 10/6/19


20:28


 


Glucose (Fingerstick)


 185 mg/dL


(70-99) 


 76 mg/dL


(70-99) 72 mg/dL


(70-99)


 


Sodium Level


 


 139 mmol/L


(136-145) 


 





 


Potassium Level


 


 4.7 mmol/L


(3.5-5.1) 


 





 


Chloride Level


 


 100 mmol/L


() 


 





 


Carbon Dioxide Level


 


 30 mmol/L


(21-32) 


 





 


Anion Gap  9 (6-14)   


 


Blood Urea Nitrogen


 


 21 mg/dL


(8-26) 


 





 


Creatinine


 


 1.2 mg/dL


(0.7-1.3) 


 





 


Estimated GFR


(Cockcroft-Gault) 


 60.8 


 


 





 


Glucose Level


 


 64 mg/dL


(70-99) 


 





 


Calcium Level


 


 9.5 mg/dL


(8.5-10.1) 


 





 


Phosphorus Level


 


 3.5 mg/dL


(2.6-4.7) 


 





 


Albumin


 


 3.5 g/dL


(3.4-5.0) 


 





 


Test


 10/7/19


07:26 


 


 





 


Glucose (Fingerstick)


 174 mg/dL


(70-99) 


 


 














Assessment and Plan


Assessmemt and Plan


Problems


Medical Problems:


(1) Hypoglycemia


Status: Acute  





(2) Unable to walk


Status: Acute  











Comment


Review of Relevant


I have reviewed the following items jarrod (where applicable) has been applied.


Labs





Laboratory Tests








Test


 10/5/19


16:39 10/5/19


20:31 10/6/19


08:02 10/6/19


12:10


 


Glucose (Fingerstick)


 101 mg/dL


(70-99) 122 mg/dL


(70-99) 178 mg/dL


(70-99) 185 mg/dL


(70-99)


 


Test


 10/6/19


15:35 10/6/19


16:52 10/6/19


20:28 10/7/19


07:26


 


Sodium Level


 139 mmol/L


(136-145) 


 


 





 


Potassium Level


 4.7 mmol/L


(3.5-5.1) 


 


 





 


Chloride Level


 100 mmol/L


() 


 


 





 


Carbon Dioxide Level


 30 mmol/L


(21-32) 


 


 





 


Anion Gap 9 (6-14)    


 


Blood Urea Nitrogen


 21 mg/dL


(8-26) 


 


 





 


Creatinine


 1.2 mg/dL


(0.7-1.3) 


 


 





 


Estimated GFR


(Cockcroft-Gault) 60.8 


 


 


 





 


Glucose Level


 64 mg/dL


(70-99) 


 


 





 


Calcium Level


 9.5 mg/dL


(8.5-10.1) 


 


 





 


Phosphorus Level


 3.5 mg/dL


(2.6-4.7) 


 


 





 


Albumin


 3.5 g/dL


(3.4-5.0) 


 


 





 


Glucose (Fingerstick)


 


 76 mg/dL


(70-99) 72 mg/dL


(70-99) 174 mg/dL


(70-99)








Laboratory Tests








Test


 10/6/19


12:10 10/6/19


15:35 10/6/19


16:52 10/6/19


20:28


 


Glucose (Fingerstick)


 185 mg/dL


(70-99) 


 76 mg/dL


(70-99) 72 mg/dL


(70-99)


 


Sodium Level


 


 139 mmol/L


(136-145) 


 





 


Potassium Level


 


 4.7 mmol/L


(3.5-5.1) 


 





 


Chloride Level


 


 100 mmol/L


() 


 





 


Carbon Dioxide Level


 


 30 mmol/L


(21-32) 


 





 


Anion Gap  9 (6-14)   


 


Blood Urea Nitrogen


 


 21 mg/dL


(8-26) 


 





 


Creatinine


 


 1.2 mg/dL


(0.7-1.3) 


 





 


Estimated GFR


(Cockcroft-Gault) 


 60.8 


 


 





 


Glucose Level


 


 64 mg/dL


(70-99) 


 





 


Calcium Level


 


 9.5 mg/dL


(8.5-10.1) 


 





 


Phosphorus Level


 


 3.5 mg/dL


(2.6-4.7) 


 





 


Albumin


 


 3.5 g/dL


(3.4-5.0) 


 





 


Test


 10/7/19


07:26 


 


 





 


Glucose (Fingerstick)


 174 mg/dL


(70-99) 


 


 











Microbiology


10/2/19 Urine Culture - Final, Complete


          


10/2/19 Urine Culture Result 1 (CAROL ANN) - Final, Complete


Medications





Current Medications


Acetaminophen (Tylenol) 500 mg Q6HRS  PRN PO PAIN/FEVER;  Start 10/2/19 at 

22:00;  Stop 10/3/19 at 12:48;  Status DC


Aspirin (Children'S Aspirin) 81 mg DAILYWBKFT PO ;  Start 10/3/19 at 08:00;  

Stop 10/3/19 at 09:35;  Status DC


Vitamin D (Vitamin D3) 1,000 unit DAILY PO  Last administered on 10/7/19at 08:

36;  Start 10/3/19 at 09:00


Citalopram Hydrobromide (CeleXA) 20 mg DAILY PO  Last administered on 10/7/19at 

08:36;  Start 10/3/19 at 09:00


Clopidogrel Bisulfate (Plavix) 75 mg DAILY PO ;  Start 10/3/19 at 09:00;  Stop 

10/3/19 at 09:35;  Status DC


Diclofenac Sodium (Voltaren) 1 pablo BID TP ;  Start 10/3/19 at 09:00;  Stop 

10/3/19 at 09:35;  Status DC


Docusate Sodium (Colace) 100 mg BID PO  Last administered on 10/7/19at 08:36;  

Start 10/3/19 at 09:00


Ergocalciferol (Vitamin D2) 50,000 unit QFR PO  Last administered on 10/4/19at 

17:58;  Start 10/4/19 at 16:00


Lisinopril (Prinivil) 5 mg DAILY PO  Last administered on 10/7/19at 08:35;  S

tart 10/3/19 at 09:00


Metformin HCl (Glucophage) 850 mg BIDWMEALS PO ;  Start 10/3/19 at 08:00;  Stop 

10/3/19 at 09:35;  Status DC


Metoprolol Succinate (Toprol Xl) 25 mg DAILY PO ;  Start 10/3/19 at 09:00;  Stop

10/3/19 at 09:35;  Status DC


Midodrine (Proamatine) 5 mg DAILY PO ;  Start 10/3/19 at 09:00;  Stop 10/3/19 at

09:35;  Status DC


Nitroglycerin (Nitrostat) 0.4 mg PRN Q5MIN  PRN SL CHEST PAIN;  Start 10/2/19 at

22:00


Oxycodone/ Acetaminophen (Percocet 10/325) 1 tab PRN Q8HRS  PRN PO SEVERE PAIN 

Last administered on 10/5/19at 00:00;  Start 10/2/19 at 22:00


Atorvastatin Calcium (Lipitor) 80 mg HS PO  Last administered on 10/6/19at 

21:05;  Start 10/2/19 at 22:45


Bupropion HCl (Wellbutrin Sr) 300 mg DAILY PO ;  Start 10/3/19 at 09:00;  Stop 

10/3/19 at 09:35;  Status DC


Fluoxetine HCl (PROzac) 40 mg DAILY PO ;  Start 10/3/19 at 09:00;  Stop 10/3/19 

at 09:35;  Status DC


Non-Formulary Medication (Tramadol Hcl/ Acetaminophen (Tramadol-Acetaminophn 

37.5-325)) mild pain QHS PO ;  Start 10/3/19 at 21:00;  Status UNV


Trazodone HCl (Desyrel) 150 mg PRN QHS  PRN PO INSOMNIA Last administered on 

10/6/19at 21:06;  Start 10/2/19 at 22:00


Triamcinolone Acetonide (Kenalog 0.1%) 1 pablo BID TP ;  Start 10/3/19 at 09:00;  

Stop 10/3/19 at 09:35;  Status DC


Acetaminophen (Tylenol) 1,000 mg Q6HRS  PRN PO PAIN/FEVER;  Start 10/2/19 at 

22:45;  Stop 10/3/19 at 12:49;  Status DC


Tramadol HCl (Ultram) 37.5 mg QHS PO  Last administered on 10/6/19at 21:06;  

Start 10/3/19 at 21:00


Acetaminophen (Tylenol) 325 mg QHS PO  Last administered on 10/6/19at 21:06;  

Start 10/3/19 at 21:00


Tramadol HCl (Ultram) 37.5 mg PRN QHS  PRN PO PAIN UNREL BY [facility] ULTRACET;  Start

10/3/19 at 21:00


Acetaminophen (Tylenol) 325 mg PRN QHS  PRN PO PAIN UNREL BY [facility] ULTRACET;  

Start 10/3/19 at 21:00


Bupropion HCl (Wellbutrin Sr) 150 mg BID PO  Last administered on 10/7/19at 

08:35;  Start 10/3/19 at 10:00


Metformin HCl (Glucophage) 850 mg TIDWMEALS PO  Last administered on 10/7/19at 

08:36;  Start 10/3/19 at 09:30


Furosemide (Lasix) 80 mg DAILY PO  Last administered on 10/7/19at 08:36;  Start 

10/3/19 at 10:00


Insulin Glargine (Lantus Syringe) 50 unit BID SQ  Last administered on 10/3/19at

12:39;  Start 10/3/19 at 10:00;  Stop 10/3/19 at 18:37;  Status DC


Insulin Human Lispro (HumaLOG) 30 units TIDAC SQ  Last administered on 10/3/19at

12:39;  Start 10/3/19 at 09:30;  Stop 10/3/19 at 18:37;  Status DC


Insulin Human Lispro (HumaLOG) 15 units PRN TID  PRN SQ WITH SNACKS;  Start 

10/3/19 at 09:30


Acetaminophen (Tylenol) 500 mg PRN Q6HRS  PRN PO MILD PAIN/FEVER;  Start 10/3/19

at 13:00


Acetaminophen (Tylenol) 1,000 mg PRN Q6HRS  PRN PO MILD PAIN/FEVER;  Start 

10/3/19 at 13:00


Insulin Glargine (Lantus Syringe) 40 unit BID SQ  Last administered on 10/7/19at

08:46;  Start 10/3/19 at 21:00


Insulin Human Lispro (HumaLOG) 25 units TIDAC SQ  Last administered on 10/7/19at

08:47;  Start 10/4/19 at 07:30


Sodium Chloride 500 ml @  500 mls/hr 1X  ONCE IV  Last administered on 10/3/19at

20:24;  Start 10/3/19 at 18:45;  Stop 10/3/19 at 19:44;  Status DC


Sodium Chloride 1,000 ml @  75 mls/hr [id number] IV  Last administered on 10/7/19at

06:21;  Start 10/6/19 at 16:30





Active Scripts


Active


Reported


Lasix (Furosemide) 80 Mg Tablet 1 Tab PO DAILY


Humulin R U-500 Kwikpen (Insulin Regular, Human) 500 Unit/1 Ml Insuln.pen 90 

Unit SQ DAILYWSUP


Humulin R U-500 Kwikpen (Insulin Regular, Human) 500 Unit/1 Ml Insuln.pen 90 

Unit SQ DAILYWLUN


Humulin R U-500 Kwikpen (Insulin Regular, Human) 500 Unit/1 Ml Insuln.pen 110 

Units SQ DAILYWBKFT


Citalopram Hbr (Citalopram Hydrobromide) 20 Mg Tablet 1 Tab PO DAILY


Levemir (Insulin Detemir) 100 Unit/1 Ml Vial 50 Unit SQ BID


Percocet  Mg Tablet ** (Oxycodone/Acetaminophen) 1 Each Tablet 1 Tab PO 

PRN Q8HRS


Novolog Flexpen (Insulin Aspart) 100 Unit/1 Ml Insuln.pen 15 Unit SQ PRN


Novolog Flexpen (Insulin Aspart) 100 Unit/1 Ml Insuln.pen 30 Unit SQ TIDWMEALS


NITROGLYCERIN SubLingual (Nitroglycerin) 0.4 Mg Tab.subl 0.4 Mg SL PRN Q5MIN PRN


Fluoxetine Hcl 40 Mg Capsule 40 Mg PO DAILY


Docusate Sodium 100 Mg Capsule 100 Mg PO BID


Atorvastatin Calcium 80 Mg Tablet 80 Mg PO HS


Acetaminophen 500 Mg Tablet 1-2 Tab PO Q6HRS


Lisinopril 5 Mg Tablet 1 Tab PO DAILY


Trazodone Hcl 150 Mg Tablet 1 Tab PO QHS PRN


Bupropion Hcl Sr (Bupropion Hcl) 150 Mg Tablet.er 150 Mg PO BID


Metformin Hcl 850 Mg Tablet 850 Mg PO TID


     hold med until 10/5/16


Vitals/I & O





Vital Sign - Last 24 Hours








 10/6/19 10/6/19 10/6/19 10/6/19





 11:45 15:15 19:00 20:00


 


Temp 98.6 98.4 99.2 





 98.6 98.4 99.2 


 


Pulse 91 83 64 


 


Resp  20 20 


 


B/P (MAP) 145/74 (97) 124/79 (94) 122/58 (79) 


 


Pulse Ox 93 94 95 


 


O2 Delivery Room Air Room Air Room Air Room Air


 


    





    





 10/6/19 10/7/19 10/7/19 10/7/19





 23:00 03:00 07:00 08:00


 


Temp 97.3 97.5 98.0 





 97.3 97.5 98.0 


 


Pulse 87 98 19 


 


Resp 20 20 19 


 


B/P (MAP) 126/45 (72) 118/65 (82) 146/68 (94) 


 


Pulse Ox 97 95 93 


 


O2 Delivery BiPAP/CPAP Room Air Room Air Room Air


 


    





    





 10/7/19   





 08:35   


 


Pulse 19   


 


B/P (MAP) 146/68   














Intake and Output   


 


 10/6/19 10/6/19 10/7/19





 15:00 23:00 07:00


 


Intake Total 200 ml 100 ml 1000 ml


 


Output Total 600 ml  1000 ml


 


Balance -400 ml 100 ml 0 ml

















DIANA MIRAMONTES MD           Oct 7, 2019 11:17

## 2019-10-07 NOTE — NUR
Late note: Orders faxed to [facility] and pt will transport via [facility] at 1500. Pt and wife notified, 
agreeable and deny any other needs at this time. Discussed with RN.

## 2019-10-07 NOTE — NUR
SW following pt. Pt has been accepted at [facility] and facility will have a bed 
available upon dc. SW discussed pt will need Bariatric bed. Physician notified regarding 
acceptance.

## 2019-10-07 NOTE — PDOC3
Discharge Summary


Date of Admission:  Oct 3, 2019


Date of Discharge:  Oct 7, 2019


Follow-Up:  1-2 days


Admitting Diagnosis comment:


DISCHARGE DX ======================








Achilles tendon injury - he felt something snap in his right heel and has pain 

and weakness on dorsiflexion. No quinolone exposure r


Near complete rupture of the Achilles tendon from the calcaneal 


insertion, with 3.5 cm proximal retraction.


Anterior talofibular and calcaneofibular ligament sprains.


HEIDY, vasomotor nephropathy


 


GAIT INSTABILITY- 2/2 cast on LLE and right leg weakness


Hypertension - cont meds


Hyperlipidemia - statin


Symptomatic hypoglycemia - his recent U-500 dosing may play a role, will hold 

this.


Obesity - morbid obesity, counseled on weight loss, consider GLP-1 or SGLT-2


H/o Achilles tendon rupture on the left - in cast currently


CAD - s/p 4x Coronary bypass surgery 12/2015


PPM in situ - dual chamber, [facility] [facility] last download 11/15/2018 normal device 

function, AFIB burden <1%, V paced 4.5%. No significant arrhythmia. 


S/p TAVR - 11/2017


Peripheral vascular disease - noted with medical management of bilateral lower 

extremity disease on non-occlusive studies in 2016


Diabetes - seeing [facility], was recently started on U-500, has had 

daily hypoglycemic episodes into the 30s and 40s./// lantus BID and lispro TID 

schedule with sliding scale while in house. Consider SGLT-2 or GLP-1





Plan:


SNF  placement. He can follow up with Orthopedic surgery, Dr. Daigle, in 2-3 

weeks. non-operative likely partial tear of right achilles tendon, can be placed

in CAM boot.


NEPHROLOGY CONSULT











34  MIN PT EXAM, CHART REVIEW D/C PLANNING  , > 50% OF TIME SPENT WITH EXAM, 

CHART REVIEW, PT CARE COORDINATION D/C TO [facility]





History of Present Illness


History of Present Illness


Mr Mercedes is a 64-year-old male w/ PMHx PPM in situ, s/p TAVR, hypertension, 

hyperlipidemia, obesity, previous Achilles tendon rupture on the left, CAD s/p 

coronary bypass surgery, peripheral vascular disease, diabetes who has been 

recovering in a cast from his left achilles rupture who got up and fell and he 

likely partially ruptured his right Achilles tendon. He is unable to walk and 

will be admitted for further treatment and diagnosis.


10/4: Seen by ortho, no immediate indication for surgery. Has plans for MRI 

outpatient, will need his pacer deactivated for this. Still not able to 

transition or walk well. Needs to have rehab.


FINAL DIAGNOSIS


Problems


Medical Problems:


(1) Hypoglycemia


Status: Acute  





(2) Unable to walk


Status: Acute  








Brief Hospital Course


Mr. Mercedes  is a 64 old [sex] who presented with [ GAIT INSTABILITY]


CONDITION AT DISCHARGE:  Improved


Discharge Medications





Current Medications


Acetaminophen (Tylenol) 500 mg Q6HRS  PRN PO PAIN/FEVER;  Start 10/2/19 at 

22:00;  Stop 10/3/19 at 12:48;  Status DC


Aspirin (Children'S Aspirin) 81 mg DAILYWBKFT PO ;  Start 10/3/19 at 08:00;  

Stop 10/3/19 at 09:35;  Status DC


Vitamin D (Vitamin D3) 1,000 unit DAILY PO  Last administered on 10/7/19at 

08:36;  Start 10/3/19 at 09:00


Citalopram Hydrobromide (CeleXA) 20 mg DAILY PO  Last administered on 10/7/19at 

08:36;  Start 10/3/19 at 09:00


Clopidogrel Bisulfate (Plavix) 75 mg DAILY PO ;  Start 10/3/19 at 09:00;  Stop 

10/3/19 at 09:35;  Status DC


Diclofenac Sodium (Voltaren) 1 pablo BID TP ;  Start 10/3/19 at 09:00;  Stop 

10/3/19 at 09:35;  Status DC


Docusate Sodium (Colace) 100 mg BID PO  Last administered on 10/7/19at 08:36;  

Start 10/3/19 at 09:00


Ergocalciferol (Vitamin D2) 50,000 unit QFR PO  Last administered on 10/4/19at 

17:58;  Start 10/4/19 at 16:00


Lisinopril (Prinivil) 5 mg DAILY PO  Last administered on 10/7/19at 08:35;  

Start 10/3/19 at 09:00


Metformin HCl (Glucophage) 850 mg BIDWMEALS PO ;  Start 10/3/19 at 08:00;  Stop 

10/3/19 at 09:35;  Status DC


Metoprolol Succinate (Toprol Xl) 25 mg DAILY PO ;  Start 10/3/19 at 09:00;  Stop

10/3/19 at 09:35;  Status DC


Midodrine (Proamatine) 5 mg DAILY PO ;  Start 10/3/19 at 09:00;  Stop 10/3/19 at

09:35;  Status DC


Nitroglycerin (Nitrostat) 0.4 mg PRN Q5MIN  PRN SL CHEST PAIN;  Start 10/2/19 at

22:00


Oxycodone/ Acetaminophen (Percocet 10/325) 1 tab PRN Q8HRS  PRN PO SEVERE PAIN 

Last administered on 10/5/19at 00:00;  Start 10/2/19 at 22:00


Atorvastatin Calcium (Lipitor) 80 mg HS PO  Last administered on 10/6/19at 

21:05;  Start 10/2/19 at 22:45


Bupropion HCl (Wellbutrin Sr) 300 mg DAILY PO ;  Start 10/3/19 at 09:00;  Stop 

10/3/19 at 09:35;  Status DC


Fluoxetine HCl (PROzac) 40 mg DAILY PO ;  Start 10/3/19 at 09:00;  Stop 10/3/19 

at 09:35;  Status DC


Non-Formulary Medication (Tramadol Hcl/ Acetaminophen (Tramadol-Acetaminophn 

37.5-325)) mild pain QHS PO ;  Start 10/3/19 at 21:00;  Status UNV


Trazodone HCl (Desyrel) 150 mg PRN QHS  PRN PO INSOMNIA Last administered on 

10/6/19at 21:06;  Start 10/2/19 at 22:00


Triamcinolone Acetonide (Kenalog 0.1%) 1 pablo BID TP ;  Start 10/3/19 at 09:00;  

Stop 10/3/19 at 09:35;  Status DC


Acetaminophen (Tylenol) 1,000 mg Q6HRS  PRN PO PAIN/FEVER;  Start 10/2/19 at 

22:45;  Stop 10/3/19 at 12:49;  Status DC


Tramadol HCl (Ultram) 37.5 mg QHS PO  Last administered on 10/6/19at 21:06;  

Start 10/3/19 at 21:00


Acetaminophen (Tylenol) 325 mg QHS PO  Last administered on 10/6/19at 21:06;  

Start 10/3/19 at 21:00


Tramadol HCl (Ultram) 37.5 mg PRN QHS  PRN PO PAIN UNREL BY SHO ULTRACET;  Start

10/3/19 at 21:00


Acetaminophen (Tylenol) 325 mg PRN QHS  PRN PO PAIN UNREL BY [facility] ULTRACET;  

Start 10/3/19 at 21:00


Bupropion HCl (Wellbutrin Sr) 150 mg BID PO  Last administered on 10/7/19at 

08:35;  Start 10/3/19 at 10:00


Metformin HCl (Glucophage) 850 mg TIDWMEALS PO  Last administered on 10/7/19at 

12:07;  Start 10/3/19 at 09:30


Furosemide (Lasix) 80 mg DAILY PO  Last administered on 10/7/19at 08:36;  Start 

10/3/19 at 10:00


Insulin Glargine (Lantus Syringe) 50 unit BID SQ  Last administered on 10/3/19at

12:39;  Start 10/3/19 at 10:00;  Stop 10/3/19 at 18:37;  Status DC


Insulin Human Lispro (HumaLOG) 30 units TIDAC SQ  Last administered on 10/3/19at

12:39;  Start 10/3/19 at 09:30;  Stop 10/3/19 at 18:37;  Status DC


Insulin Human Lispro (HumaLOG) 15 units PRN TID  PRN SQ WITH SNACKS;  Start 

10/3/19 at 09:30


Acetaminophen (Tylenol) 500 mg PRN Q6HRS  PRN PO MILD PAIN/FEVER;  Start 10/3/19

at 13:00


Acetaminophen (Tylenol) 1,000 mg PRN Q6HRS  PRN PO MILD PAIN/FEVER;  Start 

10/3/19 at 13:00


Insulin Glargine (Lantus Syringe) 40 unit BID SQ  Last administered on 10/7/19at

08:46;  Start 10/3/19 at 21:00


Insulin Human Lispro (HumaLOG) 25 units TIDAC SQ  Last administered on 10/7/19at

12:09;  Start 10/4/19 at 07:30


Sodium Chloride 500 ml @  500 mls/hr 1X  ONCE IV  Last administered on 10/3/19at

20:24;  Start 10/3/19 at 18:45;  Stop 10/3/19 at 19:44;  Status DC


Sodium Chloride 1,000 ml @  75 mls/hr [id number] IV  Last administered on 10/7/19at

06:21;  Start 10/6/19 at 16:30





Active Scripts


Active


Reported


Lasix (Furosemide) 80 Mg Tablet 1 Tab PO DAILY


Humulin R U-500 Kwikpen (Insulin Regular, Human) 500 Unit/1 Ml Insuln.pen 90 

Unit SQ DAILYWSUP


Humulin R U-500 Kwikpen (Insulin Regular, Human) 500 Unit/1 Ml Insuln.pen 90 

Unit SQ DAILYWLUN


Humulin R U-500 Kwikpen (Insulin Regular, Human) 500 Unit/1 Ml Insuln.pen 110 

Units SQ DAILYWBKFT


Citalopram Hbr (Citalopram Hydrobromide) 20 Mg Tablet 1 Tab PO DAILY


Levemir (Insulin Detemir) 100 Unit/1 Ml Vial 50 Unit SQ BID


Percocet  Mg Tablet ** (Oxycodone/Acetaminophen) 1 Each Tablet 1 Tab PO 

PRN Q8HRS


Novolog Flexpen (Insulin Aspart) 100 Unit/1 Ml Insuln.pen 15 Unit SQ PRN


Novolog Flexpen (Insulin Aspart) 100 Unit/1 Ml Insuln.pen 30 Unit SQ TIDWMEALS


NITROGLYCERIN SubLingual (Nitroglycerin) 0.4 Mg Tab.subl 0.4 Mg SL PRN Q5MIN PRN


Fluoxetine Hcl 40 Mg Capsule 40 Mg PO DAILY


Docusate Sodium 100 Mg Capsule 100 Mg PO BID


Atorvastatin Calcium 80 Mg Tablet 80 Mg PO HS


Acetaminophen 500 Mg Tablet 1-2 Tab PO Q6HRS


Lisinopril 5 Mg Tablet 1 Tab PO DAILY


Trazodone Hcl 150 Mg Tablet 1 Tab PO QHS PRN


Bupropion Hcl Sr (Bupropion Hcl) 150 Mg Tablet.er 150 Mg PO BID


Metformin Hcl 850 Mg Tablet 850 Mg PO TID


     hold med until 10/5/16


Vital Signs





Vital Signs








  Date Time  Temp Pulse Resp B/P (MAP) Pulse Ox O2 Delivery O2 Flow Rate FiO2


 


10/7/19 11:00 98.0 85 19 151/66 (94) 93 Room Air  





 98.0       








Labs





Laboratory Tests








Test


 10/5/19


16:39 10/5/19


20:31 10/6/19


08:02 10/6/19


12:10


 


Glucose (Fingerstick)


 101 mg/dL


(70-99) 122 mg/dL


(70-99) 178 mg/dL


(70-99) 185 mg/dL


(70-99)


 


Test


 10/6/19


15:35 10/6/19


16:52 10/6/19


20:28 10/7/19


07:26


 


Sodium Level


 139 mmol/L


(136-145) 


 


 





 


Potassium Level


 4.7 mmol/L


(3.5-5.1) 


 


 





 


Chloride Level


 100 mmol/L


() 


 


 





 


Carbon Dioxide Level


 30 mmol/L


(21-32) 


 


 





 


Anion Gap 9 (6-14)    


 


Blood Urea Nitrogen


 21 mg/dL


(8-26) 


 


 





 


Creatinine


 1.2 mg/dL


(0.7-1.3) 


 


 





 


Estimated GFR


(Cockcroft-Gault) 60.8 


 


 


 





 


Glucose Level


 64 mg/dL


(70-99) 


 


 





 


Calcium Level


 9.5 mg/dL


(8.5-10.1) 


 


 





 


Phosphorus Level


 3.5 mg/dL


(2.6-4.7) 


 


 





 


Albumin


 3.5 g/dL


(3.4-5.0) 


 


 





 


Glucose (Fingerstick)


 


 76 mg/dL


(70-99) 72 mg/dL


(70-99) 174 mg/dL


(70-99)


 


Test


 10/7/19


11:31 


 


 





 


Glucose (Fingerstick)


 213 mg/dL


(70-99) 


 


 











Laboratory Tests








Test


 10/6/19


15:35 10/6/19


16:52 10/6/19


20:28 10/7/19


07:26


 


Sodium Level


 139 mmol/L


(136-145) 


 


 





 


Potassium Level


 4.7 mmol/L


(3.5-5.1) 


 


 





 


Chloride Level


 100 mmol/L


() 


 


 





 


Carbon Dioxide Level


 30 mmol/L


(21-32) 


 


 





 


Anion Gap 9 (6-14)    


 


Blood Urea Nitrogen


 21 mg/dL


(8-26) 


 


 





 


Creatinine


 1.2 mg/dL


(0.7-1.3) 


 


 





 


Estimated GFR


(Cockcroft-Gault) 60.8 


 


 


 





 


Glucose Level


 64 mg/dL


(70-99) 


 


 





 


Calcium Level


 9.5 mg/dL


(8.5-10.1) 


 


 





 


Phosphorus Level


 3.5 mg/dL


(2.6-4.7) 


 


 





 


Albumin


 3.5 g/dL


(3.4-5.0) 


 


 





 


Glucose (Fingerstick)


 


 76 mg/dL


(70-99) 72 mg/dL


(70-99) 174 mg/dL


(70-99)


 


Test


 10/7/19


11:31 


 


 





 


Glucose (Fingerstick)


 213 mg/dL


(70-99) 


 


 











Allergies





                                    Allergies








Coded Allergies Type Severity Reaction Last Updated Verified


 


  cortisone Allergy Intermediate Pt had swelling at injection site 12/17/15 Yes








Disposition/Orders:  Other (D/C TO SNF)


Patient Instructions


D/C PLANNING 33 MIN











DIANA MIRAMONTES MD           Oct 7, 2019 13:01

## 2019-10-07 NOTE — SNU/HH DC
DISCHARGE ORDERS


DISCHARGE INFORMATION:


FINAL DIAGNOSIS


Problems


Medical Problems:


(1) Hypoglycemia


Status: Acute  





(2) Unable to walk


Status: Acute  








CONDITION ON DISCHARGE:  Stable





CODE STATUS:


Code Status:  Full





SKILLED NURSING:


SNF STAY <30 DAYS:  Yes





HOSPICE:


HOSPICE:  No


HOSPICE EVAL & TREAT:  No





LTAC:


ADMIT TO LTAC:  No





POST DISCHARGE ORDERS:


ACTIVITY ORDERS:  Activity as tolerated


WEIGHT BEARING STATUS:  As tolerated


BATHING ORDERS:  No Tub Bath until see 


DIET AFTER DISCHARGE:  ADA


WOUND/INCISION CARE:  Keep wound/cast CDI





CHECKS AFTER DISCHARGE:


CHECKS AFTER DISCHARGE:  Check blood press - daily, Check blood sugar, ac/hs





TREATMENT/EQUIPMENT ORDERS:


Physical Therapy For:  Evalulation/Treatment


Occupational Therapy For:  Evaluation/Treatment





DISCHARGE MEDICATIONS:


Home Meds


Active Scripts


Cholecalciferol (Vitamin D3) (VITAMIN D3) 1,000 Unit Tablet, 1000 UNIT PO DAILY 

for BONE HEALTH for 30 Days, #30 TAB


   Prov:DIANA MIRAMONTES MD         10/7/19


Insulin Lispro (HUMALOG) 100 Unit/1 Ml Insuln.pen, 25 UNITS SQ TIDAC for 

DIABETES for 30 Days, #2 EACH


   Prov:DIANA MIRAMONTES MD         10/7/19


Insulin Glargine,Hum.rec.anlog (LANTUS) 100 Unit/1 Ml Vial, 40 UNIT SQ BID for 

DIABETES for 30 Days, #2 EACH


   Prov:DIANA MIRAMONTES MD         10/7/19


Tramadol Hcl (TRAMADOL HCL) 50 Mg Tablet, 37.5 MG PO PRN QHS PRN for PAIN UNREL 

BY SHO ULTRACET for 14 Days, #20 TAB


   Prov:DIANA MIRAMONTES MD         10/7/19


Reported Medications


Furosemide (LASIX) 80 Mg Tablet, 1 TAB PO DAILY for HF, #30 TAB 5 Refills


   10/3/19


Citalopram Hydrobromide (CITALOPRAM HBR) 20 Mg Tablet, 1 TAB PO DAILY for 

depression , #30 TAB 5 Refills


   11/16/18


Nitroglycerin (NITROGLYCERIN SubLingual) 0.4 Mg Tab.subl, 0.4 MG SL PRN Q5MIN 

PRN for CHEST PAIN, BOTTLE


   12/30/17


Fluoxetine Hcl (FLUOXETINE HCL) 40 Mg Capsule, 40 MG PO DAILY, CAP


   12/30/17


Docusate Sodium (DOCUSATE SODIUM) 100 Mg Capsule, 100 MG PO BID, CAP


   12/30/17


Atorvastatin Calcium (ATORVASTATIN CALCIUM) 80 Mg Tablet, 80 MG PO HS for FOR 

CHOLESTEROL, #30 TAB 0 Refills


   12/30/17


Acetaminophen (ACETAMINOPHEN) 500 Mg Tablet, 1-2 TAB PO Q6HRS, #60 TAB


   12/30/17


Lisinopril (LISINOPRIL) 5 Mg Tablet, 1 TAB PO DAILY, #30 TAB 5 Refills


   10/3/16


Trazodone Hcl (TRAZODONE HCL) 150 Mg Tablet, 1 TAB PO QHS PRN for INSOMNIA, #30 

TAB 1 Refill


   12/10/15


Bupropion Hcl (BUPROPION HCL SR) 150 Mg Tablet.er, 150 MG PO BID for Depression


   3/30/15


Metformin Hcl (METFORMIN HCL) 850 Mg Tablet, 850 MG PO TID for DM


   hold med until 10/5/16


   12/18/13


Discontinued Reported Medications


Insulin Regular, Human (Humulin R U-500 Kwikpen) 500 Unit/1 Ml Insuln.pen, 90 

UNIT SQ DAILYWSUP for diabetic, EACH


   10/2/19


Insulin Regular, Human (Humulin R U-500 Kwikpen) 500 Unit/1 Ml Insuln.pen, 90 

UNIT SQ DAILYWLUN for diabetic, EACH


   10/2/19


Insulin Regular, Human (Humulin R U-500 Kwikpen) 500 Unit/1 Ml Insuln.pen, 110 

UNITS SQ DAILYWBKFT for diabetic


   10/2/19


Insulin Detemir (LEVEMIR) 100 Unit/1 Ml Vial, 50 UNIT SQ BID for blood sugar 

maintance , VIAL


   11/16/18


Oxycodone/Apap  (PERCOCET  MG TABLET **) 1 Each Tablet, 1 TAB PO PRN

Q8HRS, #90 TAB


   12/30/17


Insulin Aspart (NOVOLOG FLEXPEN) 100 Unit/1 Ml Insuln.pen, 15 UNIT SQ PRN, SYR


   12/30/17


Insulin Aspart (NOVOLOG FLEXPEN) 100 Unit/1 Ml Insuln.pen, 30 UNIT SQ TIDWMEALS,

SYR


   12/30/17











DIANA MIRAMONTES MD           Oct 7, 2019 13:07

## 2019-10-07 NOTE — NUR
REVIEWED DISCHARGE INSTRUCTIONS WITH ESTELA AND HIS WIFE. HE IS TRANSFERRING TO [facility]; REPORT CALLED TO [facility]. SALINE LOCK REMOVED. FOLLOW UP WITH DR. MORROW IN 2 WEEKS 
WIFE CALLED AND MADE AN APPT. ALSO, INFORMED WIFE AND PATIENT TO INFORM [facility] TO [facility] AND 
PLACE INSERT IN RIGHT BOOT. THEY WAS CALLED THIS AM.

## 2019-10-07 NOTE — PDOC2
CONSULT


Date of Consult


Date of Consult


DATE: 10/7/19 


TIME: 11:19





History of Present Illness


Reason for Visit:


THIS IS A 64 YR OLD WITH LEFT ANKLE PAIN AND ORTHO EVAL ONGOING FOR POSSIBLE 

ACHILLES TENDON TEAR. RENAL CONSULT ASKED DUE TO CR OF 1.7 ON ADMIT. WITH MILD 

HYDRATION CR DOWN TO NL NOW. LYTES STABLE. HEMODYNAMICALLY STABLE. UA CLEAR FOR 

ANY PROTEIN OR BLOOD





Past Medical History


Cardiovascular:  CAD, HTN, Syncope, Hyperlipidemia, Aortic stenosis, Other


Pulmonary:  COPD, Other


CENTRAL NERVOUS SYSTEM:  Periperal neuropathy


GI:  GERD


Hepatobiliary:  Cholelithiasis


Psych:  Depression


Musculoskeletal:   low back pain, Osteoarthritis


Rheumatologic:  Fibromyalgia


Renal/:  No pertinent hx


Endocrine:  Diabetes





Past Surgical History


Past Surgical History:  Pacemaker, Appendectomy, Arthroscopy, Cholecystectomy, 

CABG, Other





Family History


Family History:  Heart Disease





Social History


ALCOHOL:  none


Drugs:  None


Lives:  Homeless





Current Problem List


Problem List


Problems


Medical Problems:


(1) Hypoglycemia


Status: Acute  





(2) Unable to walk


Status: Acute  











Current Medications


Current Medications





Current Medications


Acetaminophen (Tylenol) 500 mg Q6HRS  PRN PO PAIN/FEVER;  Start 10/2/19 at 

22:00;  Stop 10/3/19 at 12:48;  Status DC


Aspirin (Children'S Aspirin) 81 mg DAILYWBKFT PO ;  Start 10/3/19 at 08:00;  

Stop 10/3/19 at 09:35;  Status DC


Vitamin D (Vitamin D3) 1,000 unit DAILY PO  Last administered on 10/7/19at 

08:36;  Start 10/3/19 at 09:00


Citalopram Hydrobromide (CeleXA) 20 mg DAILY PO  Last administered on 10/7/19at 

08:36;  Start 10/3/19 at 09:00


Clopidogrel Bisulfate (Plavix) 75 mg DAILY PO ;  Start 10/3/19 at 09:00;  Stop 

10/3/19 at 09:35;  Status DC


Diclofenac Sodium (Voltaren) 1 pablo BID TP ;  Start 10/3/19 at 09:00;  Stop 

10/3/19 at 09:35;  Status DC


Docusate Sodium (Colace) 100 mg BID PO  Last administered on 10/7/19at 08:36;  

Start 10/3/19 at 09:00


Ergocalciferol (Vitamin D2) 50,000 unit QFR PO  Last administered on 10/4/19at 

17:58;  Start 10/4/19 at 16:00


Lisinopril (Prinivil) 5 mg DAILY PO  Last administered on 10/7/19at 08:35;  

Start 10/3/19 at 09:00


Metformin HCl (Glucophage) 850 mg BIDWMEALS PO ;  Start 10/3/19 at 08:00;  Stop 

10/3/19 at 09:35;  Status DC


Metoprolol Succinate (Toprol Xl) 25 mg DAILY PO ;  Start 10/3/19 at 09:00;  Stop

10/3/19 at 09:35;  Status DC


Midodrine (Proamatine) 5 mg DAILY PO ;  Start 10/3/19 at 09:00;  Stop 10/3/19 at

09:35;  Status DC


Nitroglycerin (Nitrostat) 0.4 mg PRN Q5MIN  PRN SL CHEST PAIN;  Start 10/2/19 at

22:00


Oxycodone/ Acetaminophen (Percocet 10/325) 1 tab PRN Q8HRS  PRN PO SEVERE PAIN 

Last administered on 10/5/19at 00:00;  Start 10/2/19 at 22:00


Atorvastatin Calcium (Lipitor) 80 mg HS PO  Last administered on 10/6/19at 

21:05;  Start 10/2/19 at 22:45


Bupropion HCl (Wellbutrin Sr) 300 mg DAILY PO ;  Start 10/3/19 at 09:00;  Stop 

10/3/19 at 09:35;  Status DC


Fluoxetine HCl (PROzac) 40 mg DAILY PO ;  Start 10/3/19 at 09:00;  Stop 10/3/19 

at 09:35;  Status DC


Non-Formulary Medication (Tramadol Hcl/ Acetaminophen (Tramadol-Acetaminophn 

37.5-325)) mild pain QHS PO ;  Start 10/3/19 at 21:00;  Status UNV


Trazodone HCl (Desyrel) 150 mg PRN QHS  PRN PO INSOMNIA Last administered on 

10/6/19at 21:06;  Start 10/2/19 at 22:00


Triamcinolone Acetonide (Kenalog 0.1%) 1 pablo BID TP ;  Start 10/3/19 at 09:00;  

Stop 10/3/19 at 09:35;  Status DC


Acetaminophen (Tylenol) 1,000 mg Q6HRS  PRN PO PAIN/FEVER;  Start 10/2/19 at 

22:45;  Stop 10/3/19 at 12:49;  Status DC


Tramadol HCl (Ultram) 37.5 mg QHS PO  Last administered on 10/6/19at 21:06;  

Start 10/3/19 at 21:00


Acetaminophen (Tylenol) 325 mg QHS PO  Last administered on 10/6/19at 21:06;  

Start 10/3/19 at 21:00


Tramadol HCl (Ultram) 37.5 mg PRN QHS  PRN PO PAIN UNREL BY SHO ULTRACET;  Start

10/3/19 at 21:00


Acetaminophen (Tylenol) 325 mg PRN QHS  PRN PO PAIN UNREL BY SHO ULTRACET;  

Start 10/3/19 at 21:00


Bupropion HCl (Wellbutrin Sr) 150 mg BID PO  Last administered on 10/7/19at 

08:35;  Start 10/3/19 at 10:00


Metformin HCl (Glucophage) 850 mg TIDWMEALS PO  Last administered on 10/7/19at 

08:36;  Start 10/3/19 at 09:30


Furosemide (Lasix) 80 mg DAILY PO  Last administered on 10/7/19at 08:36;  Start 

10/3/19 at 10:00


Insulin Glargine (Lantus Syringe) 50 unit BID SQ  Last administered on 10/3/19at

12:39;  Start 10/3/19 at 10:00;  Stop 10/3/19 at 18:37;  Status DC


Insulin Human Lispro (HumaLOG) 30 units TIDAC SQ  Last administered on 10/3/19at

12:39;  Start 10/3/19 at 09:30;  Stop 10/3/19 at 18:37;  Status DC


Insulin Human Lispro (HumaLOG) 15 units PRN TID  PRN SQ WITH SNACKS;  Start 

10/3/19 at 09:30


Acetaminophen (Tylenol) 500 mg PRN Q6HRS  PRN PO MILD PAIN/FEVER;  Start 10/3/19

at 13:00


Acetaminophen (Tylenol) 1,000 mg PRN Q6HRS  PRN PO MILD PAIN/FEVER;  Start 

10/3/19 at 13:00


Insulin Glargine (Lantus Syringe) 40 unit BID SQ  Last administered on 10/7/19at

08:46;  Start 10/3/19 at 21:00


Insulin Human Lispro (HumaLOG) 25 units TIDAC SQ  Last administered on 10/7/19at

08:47;  Start 10/4/19 at 07:30


Sodium Chloride 500 ml @  500 mls/hr 1X  ONCE IV  Last administered on 10/3/19at

20:24;  Start 10/3/19 at 18:45;  Stop 10/3/19 at 19:44;  Status DC


Sodium Chloride 1,000 ml @  75 mls/hr [id number] IV  Last administered on 10/7/19at

06:21;  Start 10/6/19 at 16:30





Active Scripts


Active


Reported


Lasix (Furosemide) 80 Mg Tablet 1 Tab PO DAILY


Humulin R U-500 Kwikpen (Insulin Regular, Human) 500 Unit/1 Ml Insuln.pen 90 

Unit SQ DAILYWSUP


Humulin R U-500 Kwikpen (Insulin Regular, Human) 500 Unit/1 Ml Insuln.pen 90 

Unit SQ DAILYWLUN


Humulin R U-500 Kwikpen (Insulin Regular, Human) 500 Unit/1 Ml Insuln.pen 110 

Units SQ DAILYWBKFT


Citalopram Hbr (Citalopram Hydrobromide) 20 Mg Tablet 1 Tab PO DAILY


Levemir (Insulin Detemir) 100 Unit/1 Ml Vial 50 Unit SQ BID


Percocet  Mg Tablet ** (Oxycodone/Acetaminophen) 1 Each Tablet 1 Tab PO 

PRN Q8HRS


Novolog Flexpen (Insulin Aspart) 100 Unit/1 Ml Insuln.pen 15 Unit SQ PRN


Novolog Flexpen (Insulin Aspart) 100 Unit/1 Ml Insuln.pen 30 Unit SQ TIDWMEALS


NITROGLYCERIN SubLingual (Nitroglycerin) 0.4 Mg Tab.subl 0.4 Mg SL PRN Q5MIN PRN


Fluoxetine Hcl 40 Mg Capsule 40 Mg PO DAILY


Docusate Sodium 100 Mg Capsule 100 Mg PO BID


Atorvastatin Calcium 80 Mg Tablet 80 Mg PO HS


Acetaminophen 500 Mg Tablet 1-2 Tab PO Q6HRS


Lisinopril 5 Mg Tablet 1 Tab PO DAILY


Trazodone Hcl 150 Mg Tablet 1 Tab PO QHS PRN


Bupropion Hcl Sr (Bupropion Hcl) 150 Mg Tablet.er 150 Mg PO BID


Metformin Hcl 850 Mg Tablet 850 Mg PO TID


     hold med until 10/5/16





Allergies


Allergies:  


Coded Allergies:  


     cortisone (Verified  Allergy, Intermediate, Pt had swelling at injection 

site, 12/17/15)





ROS


General:  YES: Fatigue, Malaise


HEENT:  YES: Heacaches


ALLERGY AND IMMUNOLOGY:  YES: Seasonal Allergies


Gastrointestinal:  Yes Constipation


Genitourinary:  YES Other (NOCTURIA)


Musculoskeletal:  Yes Joint Pain, Yes Muscular Weakness


Neurological:  Yes Weakness


Skin:  Yes Dry Skin





Physical Exam


General:  Alert, Oriented X3, Cooperative, No acute distress


HEENT:  Atraumatic, PERRLA


Lungs:  Clear to auscultation, Normal air movement


Heart:  Regular rate


Abdomen:  Normal bowel sounds, Soft, No tenderness


Skin:  No breakdown


Neuro:  Normal speech


Psych/Mental Status:  Mental status NL, Mood NL


MUSCULOSKELETAL:  No deformity





Vitals


VITALS





Vital Signs








  Date Time  Temp Pulse Resp B/P (MAP) Pulse Ox O2 Delivery O2 Flow Rate FiO2


 


10/7/19 11:00 98.0 85 19 151/66 (94) 93 Room Air  





 98.0       











Labs


Labs





Laboratory Tests








Test


 10/5/19


16:39 10/5/19


20:31 10/6/19


08:02 10/6/19


12:10


 


Glucose (Fingerstick)


 101 mg/dL


(70-99) 122 mg/dL


(70-99) 178 mg/dL


(70-99) 185 mg/dL


(70-99)


 


Test


 10/6/19


15:35 10/6/19


16:52 10/6/19


20:28 10/7/19


07:26


 


Sodium Level


 139 mmol/L


(136-145) 


 


 





 


Potassium Level


 4.7 mmol/L


(3.5-5.1) 


 


 





 


Chloride Level


 100 mmol/L


() 


 


 





 


Carbon Dioxide Level


 30 mmol/L


(21-32) 


 


 





 


Anion Gap 9 (6-14)    


 


Blood Urea Nitrogen


 21 mg/dL


(8-26) 


 


 





 


Creatinine


 1.2 mg/dL


(0.7-1.3) 


 


 





 


Estimated GFR


(Cockcroft-Gault) 60.8 


 


 


 





 


Glucose Level


 64 mg/dL


(70-99) 


 


 





 


Calcium Level


 9.5 mg/dL


(8.5-10.1) 


 


 





 


Phosphorus Level


 3.5 mg/dL


(2.6-4.7) 


 


 





 


Albumin


 3.5 g/dL


(3.4-5.0) 


 


 





 


Glucose (Fingerstick)


 


 76 mg/dL


(70-99) 72 mg/dL


(70-99) 174 mg/dL


(70-99)








Laboratory Tests








Test


 10/6/19


12:10 10/6/19


15:35 10/6/19


16:52 10/6/19


20:28


 


Glucose (Fingerstick)


 185 mg/dL


(70-99) 


 76 mg/dL


(70-99) 72 mg/dL


(70-99)


 


Sodium Level


 


 139 mmol/L


(136-145) 


 





 


Potassium Level


 


 4.7 mmol/L


(3.5-5.1) 


 





 


Chloride Level


 


 100 mmol/L


() 


 





 


Carbon Dioxide Level


 


 30 mmol/L


(21-32) 


 





 


Anion Gap  9 (6-14)   


 


Blood Urea Nitrogen


 


 21 mg/dL


(8-26) 


 





 


Creatinine


 


 1.2 mg/dL


(0.7-1.3) 


 





 


Estimated GFR


(Cockcroft-Gault) 


 60.8 


 


 





 


Glucose Level


 


 64 mg/dL


(70-99) 


 





 


Calcium Level


 


 9.5 mg/dL


(8.5-10.1) 


 





 


Phosphorus Level


 


 3.5 mg/dL


(2.6-4.7) 


 





 


Albumin


 


 3.5 g/dL


(3.4-5.0) 


 





 


Test


 10/7/19


07:26 


 


 





 


Glucose (Fingerstick)


 174 mg/dL


(70-99) 


 


 














Assessment/Plan


Assessment/Plan


IMP





DEHYDRATION


HEIDY-RESOLVED


ACHILLES TENDON TEAR





PLAN





CONT IVF AS NEEDED


HEIDY RESOLVED


WILL SIGN OFF


PLEASE CALL IF NEEDED











TOY HOOVER MD                  Oct 7, 2019 11:22

## 2019-10-07 NOTE — NUR
ASSUMED CARE AT 10 AM. WIFE AT BEDSIDE. CALLED  PER REQUEST FOR INSERTS IN BOOT.[facility] 
STATED THAT THEY WILL DO IT AT [facility].

## 2019-10-16 ENCOUNTER — HOSPITAL ENCOUNTER (OUTPATIENT)
Dept: HOSPITAL 61 - SPEC | Age: 64
Discharge: HOME | End: 2019-10-16
Payer: MEDICARE

## 2019-10-16 DIAGNOSIS — L08.9: Primary | ICD-10-CM

## 2019-10-16 PROCEDURE — 87071 CULTURE AEROBIC QUANT OTHER: CPT

## 2019-10-16 PROCEDURE — 87075 CULTR BACTERIA EXCEPT BLOOD: CPT

## 2020-01-28 ENCOUNTER — HOSPITAL ENCOUNTER (OUTPATIENT)
Dept: HOSPITAL 61 - US | Age: 65
Discharge: HOME | End: 2020-01-28
Attending: ORTHOPAEDIC SURGERY
Payer: MEDICARE

## 2020-01-28 DIAGNOSIS — M79.661: Primary | ICD-10-CM

## 2020-01-28 DIAGNOSIS — M79.662: ICD-10-CM

## 2020-01-28 PROCEDURE — 93970 EXTREMITY STUDY: CPT

## 2020-01-28 NOTE — RAD
VENOUS LOWER EXT BILATERAL

 

History: Right and left calf pain.

 

Comparison:  August 12, 2019

 

Discussion: 

 

Multiple longitudinal and transverse high resolution real-time images of 

the venous system of bilateral lower extremity were obtained with color 

and Doppler sampling. The common femoral, superficial femoral, popliteal 

and proximal calf veins are all patent and demonstrate normal flow and 

compressibility. Normal respiratory phasicity and augmentation is present.

 

 

Impression: 

1.  No evidence of deep vein thrombosis within the bilateral lower 

extremities.

 

Electronically signed by: Bulmaro Richardson DO (1/28/2020 4:43 PM) [id number]